# Patient Record
Sex: FEMALE | Race: WHITE | NOT HISPANIC OR LATINO | URBAN - METROPOLITAN AREA
[De-identification: names, ages, dates, MRNs, and addresses within clinical notes are randomized per-mention and may not be internally consistent; named-entity substitution may affect disease eponyms.]

---

## 2017-01-03 ENCOUNTER — OFFICE VISIT - RIVER FALLS (OUTPATIENT)
Dept: FAMILY MEDICINE | Facility: CLINIC | Age: 77
End: 2017-01-03

## 2017-01-03 ASSESSMENT — MIFFLIN-ST. JEOR: SCORE: 1016.53

## 2017-04-17 ENCOUNTER — OFFICE VISIT - RIVER FALLS (OUTPATIENT)
Dept: FAMILY MEDICINE | Facility: CLINIC | Age: 77
End: 2017-04-17

## 2017-07-03 ENCOUNTER — OFFICE VISIT - RIVER FALLS (OUTPATIENT)
Dept: FAMILY MEDICINE | Facility: CLINIC | Age: 77
End: 2017-07-03

## 2017-07-03 ENCOUNTER — COMMUNICATION - RIVER FALLS (OUTPATIENT)
Dept: FAMILY MEDICINE | Facility: CLINIC | Age: 77
End: 2017-07-03

## 2017-07-03 ASSESSMENT — MIFFLIN-ST. JEOR: SCORE: 1002.92

## 2017-07-06 ENCOUNTER — OFFICE VISIT - RIVER FALLS (OUTPATIENT)
Dept: FAMILY MEDICINE | Facility: CLINIC | Age: 77
End: 2017-07-06

## 2017-07-06 ASSESSMENT — MIFFLIN-ST. JEOR: SCORE: 1013.81

## 2018-01-10 ENCOUNTER — OFFICE VISIT - RIVER FALLS (OUTPATIENT)
Dept: FAMILY MEDICINE | Facility: CLINIC | Age: 78
End: 2018-01-10

## 2018-01-10 ASSESSMENT — MIFFLIN-ST. JEOR: SCORE: 957.56

## 2019-03-05 ENCOUNTER — OFFICE VISIT - RIVER FALLS (OUTPATIENT)
Dept: FAMILY MEDICINE | Facility: CLINIC | Age: 79
End: 2019-03-05

## 2019-03-05 ASSESSMENT — MIFFLIN-ST. JEOR: SCORE: 939.42

## 2019-05-29 ENCOUNTER — OFFICE VISIT - RIVER FALLS (OUTPATIENT)
Dept: FAMILY MEDICINE | Facility: CLINIC | Age: 79
End: 2019-05-29

## 2019-05-29 ASSESSMENT — MIFFLIN-ST. JEOR: SCORE: 919.46

## 2019-12-19 ENCOUNTER — OFFICE VISIT - RIVER FALLS (OUTPATIENT)
Dept: FAMILY MEDICINE | Facility: CLINIC | Age: 79
End: 2019-12-19

## 2019-12-19 ASSESSMENT — MIFFLIN-ST. JEOR: SCORE: 916.74

## 2019-12-31 ENCOUNTER — OFFICE VISIT - RIVER FALLS (OUTPATIENT)
Dept: FAMILY MEDICINE | Facility: CLINIC | Age: 79
End: 2019-12-31

## 2019-12-31 ASSESSMENT — MIFFLIN-ST. JEOR: SCORE: 916.74

## 2020-01-09 ENCOUNTER — OFFICE VISIT - RIVER FALLS (OUTPATIENT)
Dept: FAMILY MEDICINE | Facility: CLINIC | Age: 80
End: 2020-01-09

## 2020-01-09 ASSESSMENT — MIFFLIN-ST. JEOR: SCORE: 916.74

## 2020-10-21 ENCOUNTER — OFFICE VISIT - RIVER FALLS (OUTPATIENT)
Dept: FAMILY MEDICINE | Facility: CLINIC | Age: 80
End: 2020-10-21

## 2020-10-24 LAB — BACTERIA SPEC CULT: ABNORMAL

## 2020-10-25 ENCOUNTER — COMMUNICATION - RIVER FALLS (OUTPATIENT)
Dept: FAMILY MEDICINE | Facility: CLINIC | Age: 80
End: 2020-10-25

## 2021-06-14 ENCOUNTER — OFFICE VISIT - RIVER FALLS (OUTPATIENT)
Dept: FAMILY MEDICINE | Facility: CLINIC | Age: 81
End: 2021-06-14

## 2021-06-17 ENCOUNTER — COMMUNICATION - RIVER FALLS (OUTPATIENT)
Dept: FAMILY MEDICINE | Facility: CLINIC | Age: 81
End: 2021-06-17

## 2021-06-17 LAB
BACTERIA SPEC CULT: ABNORMAL
BASOPHILS # BLD MANUAL: 89 10*3/UL (ref 0–200)
BASOPHILS NFR BLD MANUAL: 1 %
BUN SERPL-MCNC: 16 MG/DL (ref 7–25)
BUN/CREAT RATIO - HISTORICAL: ABNORMAL (ref 6–22)
CALCIUM SERPL-MCNC: 9.9 MG/DL (ref 8.6–10.4)
CHLORIDE BLD-SCNC: 102 MMOL/L (ref 98–110)
CO2 SERPL-SCNC: 29 MMOL/L (ref 20–32)
CREAT SERPL-MCNC: 0.63 MG/DL (ref 0.6–0.88)
EGFRCR SERPLBLD CKD-EPI 2021: 84 ML/MIN/1.73M2
EOSINOPHIL # BLD MANUAL: 329 10*3/UL (ref 15–500)
EOSINOPHIL NFR BLD MANUAL: 3.7 %
ERYTHROCYTE [DISTWIDTH] IN BLOOD BY AUTOMATED COUNT: 12.7 % (ref 11–15)
GLUCOSE BLD-MCNC: 101 MG/DL (ref 65–99)
HCT VFR BLD AUTO: 43.9 % (ref 35–45)
HGB BLD-MCNC: 14.4 GM/DL (ref 11.7–15.5)
LYMPHOCYTES # BLD MANUAL: 2056 10*3/UL (ref 850–3900)
LYMPHOCYTES NFR BLD MANUAL: 23.1 %
MCH RBC QN AUTO: 30.1 PG (ref 27–33)
MCHC RBC AUTO-ENTMCNC: 32.8 GM/DL (ref 32–36)
MCV RBC AUTO: 91.6 FL (ref 80–100)
MONOCYTES # BLD MANUAL: 587 10*3/UL (ref 200–950)
MONOCYTES NFR BLD MANUAL: 6.6 %
NEUTROPHILS # BLD MANUAL: 5838 10*3/UL (ref 1500–7800)
NEUTROPHILS NFR BLD MANUAL: 65.6 %
PLATELET # BLD AUTO: 392 10*3/UL (ref 140–400)
PMV BLD: 8.7 FL (ref 7.5–12.5)
POTASSIUM BLD-SCNC: 4.4 MMOL/L (ref 3.5–5.3)
RBC # BLD AUTO: 4.79 10*6/UL (ref 3.8–5.1)
SODIUM SERPL-SCNC: 138 MMOL/L (ref 135–146)
WBC # BLD AUTO: 8.9 10*3/UL (ref 3.8–10.8)

## 2021-06-23 ENCOUNTER — AMBULATORY - RIVER FALLS (OUTPATIENT)
Dept: FAMILY MEDICINE | Facility: CLINIC | Age: 81
End: 2021-06-23

## 2021-06-24 ENCOUNTER — COMMUNICATION - RIVER FALLS (OUTPATIENT)
Dept: FAMILY MEDICINE | Facility: CLINIC | Age: 81
End: 2021-06-24

## 2021-06-24 LAB — FECAL FAT, QUALITATIVE: NORMAL

## 2021-07-15 ENCOUNTER — AMBULATORY - RIVER FALLS (OUTPATIENT)
Dept: FAMILY MEDICINE | Facility: CLINIC | Age: 81
End: 2021-07-15

## 2021-07-16 ENCOUNTER — OFFICE VISIT - RIVER FALLS (OUTPATIENT)
Dept: FAMILY MEDICINE | Facility: CLINIC | Age: 81
End: 2021-07-16

## 2021-07-18 LAB — BACTERIA SPEC CULT: ABNORMAL

## 2021-07-28 ENCOUNTER — OFFICE VISIT - RIVER FALLS (OUTPATIENT)
Dept: FAMILY MEDICINE | Facility: CLINIC | Age: 81
End: 2021-07-28

## 2021-08-04 ENCOUNTER — AMBULATORY - RIVER FALLS (OUTPATIENT)
Dept: FAMILY MEDICINE | Facility: CLINIC | Age: 81
End: 2021-08-04

## 2021-08-06 ENCOUNTER — COMMUNICATION - RIVER FALLS (OUTPATIENT)
Dept: FAMILY MEDICINE | Facility: CLINIC | Age: 81
End: 2021-08-06

## 2021-08-06 LAB — BACTERIA SPEC CULT: NORMAL

## 2021-08-09 ENCOUNTER — OFFICE VISIT - RIVER FALLS (OUTPATIENT)
Dept: FAMILY MEDICINE | Facility: CLINIC | Age: 81
End: 2021-08-09

## 2022-02-11 VITALS
WEIGHT: 106 LBS | DIASTOLIC BLOOD PRESSURE: 72 MMHG | BODY MASS INDEX: 17.04 KG/M2 | HEART RATE: 88 BPM | SYSTOLIC BLOOD PRESSURE: 112 MMHG | HEIGHT: 66 IN

## 2022-02-11 VITALS
DIASTOLIC BLOOD PRESSURE: 54 MMHG | HEART RATE: 76 BPM | DIASTOLIC BLOOD PRESSURE: 73 MMHG | WEIGHT: 95.6 LBS | SYSTOLIC BLOOD PRESSURE: 114 MMHG | SYSTOLIC BLOOD PRESSURE: 129 MMHG | HEART RATE: 74 BPM | HEART RATE: 99 BPM | WEIGHT: 93.7 LBS | WEIGHT: 94.3 LBS | DIASTOLIC BLOOD PRESSURE: 67 MMHG | BODY MASS INDEX: 15.22 KG/M2 | TEMPERATURE: 99.6 F | BODY MASS INDEX: 15.12 KG/M2 | SYSTOLIC BLOOD PRESSURE: 132 MMHG | BODY MASS INDEX: 15.43 KG/M2

## 2022-02-11 VITALS
DIASTOLIC BLOOD PRESSURE: 68 MMHG | HEIGHT: 66 IN | WEIGHT: 102 LBS | HEART RATE: 116 BPM | BODY MASS INDEX: 16.39 KG/M2 | SYSTOLIC BLOOD PRESSURE: 102 MMHG | OXYGEN SATURATION: 92 %

## 2022-02-11 VITALS
HEART RATE: 72 BPM | OXYGEN SATURATION: 94 % | DIASTOLIC BLOOD PRESSURE: 82 MMHG | WEIGHT: 119 LBS | BODY MASS INDEX: 19.13 KG/M2 | SYSTOLIC BLOOD PRESSURE: 150 MMHG | HEIGHT: 66 IN | TEMPERATURE: 97.7 F

## 2022-02-11 VITALS
BODY MASS INDEX: 15.59 KG/M2 | OXYGEN SATURATION: 96 % | BODY MASS INDEX: 15.59 KG/M2 | WEIGHT: 97 LBS | DIASTOLIC BLOOD PRESSURE: 82 MMHG | SYSTOLIC BLOOD PRESSURE: 132 MMHG | HEART RATE: 80 BPM | WEIGHT: 97 LBS | HEIGHT: 66 IN | TEMPERATURE: 98.5 F | BODY MASS INDEX: 15.59 KG/M2 | HEIGHT: 66 IN | HEIGHT: 66 IN | SYSTOLIC BLOOD PRESSURE: 128 MMHG | HEART RATE: 68 BPM | TEMPERATURE: 99.5 F | DIASTOLIC BLOOD PRESSURE: 78 MMHG | WEIGHT: 97 LBS | HEART RATE: 65 BPM | OXYGEN SATURATION: 97 %

## 2022-02-11 VITALS
WEIGHT: 118.4 LBS | HEIGHT: 66 IN | HEART RATE: 75 BPM | HEIGHT: 66 IN | BODY MASS INDEX: 18.64 KG/M2 | SYSTOLIC BLOOD PRESSURE: 130 MMHG | DIASTOLIC BLOOD PRESSURE: 60 MMHG | TEMPERATURE: 98.4 F | SYSTOLIC BLOOD PRESSURE: 116 MMHG | WEIGHT: 116 LBS | HEART RATE: 84 BPM | DIASTOLIC BLOOD PRESSURE: 64 MMHG | BODY MASS INDEX: 19.03 KG/M2

## 2022-02-11 VITALS
DIASTOLIC BLOOD PRESSURE: 74 MMHG | WEIGHT: 122 LBS | TEMPERATURE: 97.8 F | BODY MASS INDEX: 19.69 KG/M2 | HEART RATE: 72 BPM | SYSTOLIC BLOOD PRESSURE: 134 MMHG

## 2022-02-11 VITALS
DIASTOLIC BLOOD PRESSURE: 80 MMHG | HEART RATE: 91 BPM | OXYGEN SATURATION: 88 % | BODY MASS INDEX: 15.69 KG/M2 | TEMPERATURE: 98.7 F | WEIGHT: 97.6 LBS | HEIGHT: 66 IN | SYSTOLIC BLOOD PRESSURE: 124 MMHG

## 2022-02-16 NOTE — PROGRESS NOTES
Patient:   ESTEBAN LEROY            MRN: [id number]            FIN: [id number]               Age:   76 years     Sex:  Female     :  1940   Associated Diagnoses:   Cystitis; Kidney stone   Author:   Senthil Duran MD      Impression and Plan   Diagnosis     Cystitis (MXO40-CV N30.01).     Kidney stone (GSB16-CC N20.0).     Course:  Worsening.    Plan:  follow up for CT stone protocol if CVA pain does not resolve.    Orders     Orders   Charges (Evaluation and Management):  48873 office outpatient visit 15 minutes (Charge) (Order): Quantity: 1, Kidney stone  Cystitis.     Orders (Selected)   Outpatient Orders  Completed  Urinalysis (Request): Dysuria  Order  Urine Culture (Request): Cystitis  Prescriptions  Prescribed  Cipro 250 mg oral tablet: 1 tab(s) ( 250 mg ), PO, q12hr, # 20 tab(s), 0 Refill(s), Type: Maintenance, Pharmacy: [facility], 1 tab(s) po q12 hrs,x10 day(s).        Visit Information      Date of Service: 2017 11:39 am  Performing Location: [facility]  Encounter#: [id number]      Primary Care Provider (PCP):  Senthil Duran MD    NPI# [id number]   Visit type:  New symptom.    Accompanied by:  No one.    Source of history:  Self.    History limitation:  None.       Chief Complaint   Chief complaint discussed and confirmed correct.     2017 11:41 AM CDT   Pt here for kidney stones        History of Present Illness             The patient presents with dysuria, right flank pain.  The dysuria is characterized by burning.  The severity of the dysuria is moderate.  The dysuria is constant.  The dysuria has lasted for 8 hour(s).  There are no modifying factors.  Associated symptoms consist of flank pain, hematuria, denies abdominal distention, denies abdominal pain, denies chills, denies fever, denies pelvic pain, denies vaginal discharge, denies urinary frequency, denies urinary hesitancy and denies urinary urgency.        Review of Systems   Constitutional:   Negative.    Eye:  Negative.    Ear/Nose/Mouth/Throat:  Negative.    Respiratory:  Negative.    Cardiovascular:  Negative.    Gastrointestinal:  Negative.    Genitourinary:  Dysuria.    Hematology/Lymphatics:  Negative.    Endocrine:  Negative.    Immunologic:  Negative.    Musculoskeletal:  Negative.    Integumentary:  Negative.    Neurologic:  Negative.    Psychiatric:  Negative.          All other systems reviewed and negative      Health Status   Allergies:    Allergic Reactions (Selected)  Moderate  Trospium (Swelling of hand)   Medications:  (Selected)   Prescriptions  Prescribed  Cipro 250 mg oral tablet: 1 tab(s) ( 250 mg ), PO, q12hr, # 20 tab(s), 0 Refill(s), Type: Maintenance, Pharmacy: [facility], 1 tab(s) po q12 hrs,x10 day(s)  ipratropium CFC free 17 mcg/inh inhalation aerosol: 2 puff(s), inh, qid, # 1 EA, 5 Refill(s), Type: Maintenance, Pharmacy: [facility], 2 puff(s) inh qid  Documented Medications  Documented  Fish Oil: ( 1,000 mg ), PO, 0 Refill(s), Type: Soft Stop  Vitamin D3 1000 intl units oral capsule: 1 cap(s) ( 1,000 International Unit ), PO, daily, 0 Refill(s), Type: Soft Stop  calcium carbonate: 0 Refill(s), Type: Soft Stop  gabapentin 100 mg oral tablet: 0 Refill(s), Type: Maintenance  lisinopril 20 mg oral tablet: 1 tab(s) ( 20 mg ), po, daily, 0 Refill(s)  omeprazole: PO, daily, 0 Refill(s), Type: Soft Stop  oxycodone 5 mg oral tablet: 1 tab(s) ( 5 mg ), PO, q4 hrs, 0 Refill(s), Type: Soft Stop  senna oral tablet: 2 tab(s), po, daily, 0 Refill(s), Type: Maintenance   Problem list:    All Problems  Allergic Rhinitis / ICD-9-.9 / Confirmed  Colon cancer / ICD-9-.9 / Confirmed  Hypertension / ICD-9-.9 / Confirmed  Pancreatitis / ICD-9-.0 / Confirmed  VITREAL HEMORRHAGE RIGHT EYE / Confirmed      Histories   Past Medical History:    Active  Hypertension (401.9)  Allergic Rhinitis (477.9)  Colon cancer (153.9)   Family History:    Cancer  Mother (Lian,  )  Heart disease  Father (Partha, )  Heart attack  Father (Partha, )     Procedure history:    No active procedure history items have been selected or recorded.   Social History:        Tobacco Assessment: Current            Current, Cigarettes, 10 per day.      Exercise and Physical Activity Assessment: Regular exercise        Physical Examination   Vital signs reviewed  and within acceptable limits    Vital Signs   2017 11:41 AM CDT Temperature Tympanic 97.8 DegF  LOW    Peripheral Pulse Rate 72 bpm    Pulse Site Radial artery    HR Method Manual    Systolic Blood Pressure 134 mmHg    Diastolic Blood Pressure 74 mmHg    Mean Arterial Pressure 94 mmHg    BP Site Left arm    BP Method Manual      Measurements from flowsheet : Measurements   2017 11:41 AM CDT   Weight Measured - Standard                122 lb     General:  No acute distress.    Respiratory:  Lungs are clear to auscultation, Respirations are non-labored, Breath sounds are equal, Symmetrical chest wall expansion.    Cardiovascular:  Normal rate, Regular rhythm, No murmur, No gallop, Good pulses equal in all extremities, Normal peripheral perfusion, No edema.    Gastrointestinal:  Soft, Non-tender, Non-distended, Normal bowel sounds, No organomegaly.    Genitourinary:  mild right CVA tenderness.    Integumentary:  Warm, Dry, Pink.    Neurologic:  Alert, Oriented.    Psychiatric:  Cooperative, Appropriate mood & affect.       Review / Management   Results review:  UA: Positive Heme and Positive LE.

## 2022-02-16 NOTE — TELEPHONE ENCOUNTER
---------------------  From: Boris CHARLES Carie SWARTZ (Phone Messages Pool ([id number] [facility] [place])   To: Hugh Torres MD;     Sent: 7/20/2021 9:27:25 AM CDT  Subject: urine culture     Phone Message    PCP:   FREDIS      Time of Call:  9:20am       Person Calling:  pt  Phone number:  [phone]    Note:   Pt calling asking for results of her urine culture from last week. Please advise     CULTURE, URINE, ROUTINE         Micro Number:      [id number]    Test Status:       Final    Specimen Source:   Urine    Specimen Quality:  Adequate    Result:            Greater than 100,000 CFU/mL of Citrobacter freundii                              C.freundii                            ----------------                            INT   JOSE     AMOX/CLAVULANATE       R     >=32     CEFAZOLIN              R     >=64 **1     CEFEPIME               S     <=1     CEFTRIAXONE            S     <=1     CIPROFLOXACIN          S     <=0.25     ERTAPENEM              S     <=0.5     GENTAMICIN             S     <=1     IMIPENEM               S     <=0.25     LEVOFLOXACIN           S     <=0.12     NITROFURANTOIN         S     <=16     PIP/TAZOBACTAM         S     <=4     TOBRAMYCIN             S     <=1     TRIMETHOPRIM/SULFA     S     <=20    S=Susceptible  I=Intermediate  R=Resistant  * = Not Tested  NR = Not Reported  **NN = See Therapy Comments      THERAPY COMMENTS        Note 1:      For uncomplicated UTI caused by E. coli,      K. pneumoniae or P. mirabilis: Cefazolin is      susceptible if JOSE <32 mcg/mL and predicts      susceptible to the oral agents cefaclor, cefdinir,      cefpodoxime, cefprozil, cefuroxime, cephalexin      and loracarbef.  Lab test performed by:  Lab Mnemonic:[facility]  [address] [place] [address]  Todd Pang M.D.    Last office visit and reason:  7/16/21 Incontinence of urine---------------------  From: Hugh Torres MD   To: Phone Messages Pool ([id number]_[facility]);      Sent: 7/20/2021 9:45:12 AM CDT  Subject: RE: urine culture   Actions: Notify the patient with results, Notify the pharmacy with prescription(s)      UTI. Levaquin 250 mg daily for 7 days.Pt informed. Rx sent.   Pt also needing to reschedule appt she has tomorrow 7/21 with TAW. Transferred to scheduling.

## 2022-02-16 NOTE — NURSING NOTE
Comprehensive Intake Entered On:  12/31/2019 2:16 PM CST    Performed On:  12/31/2019 2:13 PM CST by Shalini Pineda CMA               Summary   Chief Complaint :   Pt here for fever   Weight Measured :   97 lb(Converted to: 97 lb 0 oz, 44.00 kg)    Height Measured :   66 in(Converted to: 5 ft 6 in, 167.64 cm)    Body Mass Index :   15.65 kg/m2 (LOW)    Body Surface Area :   1.43 m2   Peripheral Pulse Rate :   65 bpm   Temperature Tympanic :   99.5 DegF(Converted to: 37.5 DegC)    Oxygen Saturation :   96 %   Shalini Pineda CMA - 12/31/2019 2:13 PM CST   Health Status   Allergies Verified? :   Yes   Medication History Verified? :   Yes   Medical History Verified? :   Yes   Pre-Visit Planning Status :   Completed   Tobacco Use? :   Current every day smoker   Shalini Pineda CMA - 12/31/2019 2:13 PM CST   Consents   Consent for Immunization Exchange :   Consent Granted   Consent for Immunizations to Providers :   Consent Granted   Shalini Pineda CMA - 12/31/2019 2:13 PM CST   Meds / Allergies   (As Of: 12/31/2019 2:16:37 PM CST)   Allergies (Active)   trospium  Estimated Onset Date:   Unspecified ; Reactions:   Swelling of hand ; Created By:   Kadi Ko RN; Reaction Status:   Active ; Category:   Drug ; Substance:   trospium ; Type:   Allergy ; Severity:   Moderate ; Updated By:   Kadi Ko RN; Reviewed Date:   12/31/2019 2:14 PM CST        Medication List   (As Of: 12/31/2019 2:16:37 PM CST)   Prescription/Discharge Order    azithromycin  :   azithromycin ; Status:   Prescribed ; Ordered As Mnemonic:   azithromycin 250 mg oral tablet ; Simple Display Line:   250 mg, 1 tab(s), PO, Daily, for 7 day(s), 7 tab(s), 0 Refill(s) ; Ordering Provider:   Senthil Duran MD; Catalog Code:   azithromycin ; Order Dt/Tm:   12/31/2019 10:42:26 AM CST          ipratropium  :   ipratropium ; Status:   Prescribed ; Ordered As Mnemonic:   ipratropium CFC free 17 mcg/inh inhalation aerosol ; Simple Display Line:   2  puff(s), inh, qid, 1 EA, 5 Refill(s) ; Ordering Provider:   Senthil Duran MD; Catalog Code:   ipratropium ; Order Dt/Tm:   12/29/2016 4:19:08 PM CST            Home Meds    cholecalciferol  :   cholecalciferol ; Status:   Documented ; Ordered As Mnemonic:   Vitamin D3 1000 intl units oral capsule ; Simple Display Line:   1,000 International Unit, 1 cap(s), PO, daily ; Catalog Code:   cholecalciferol ; Order Dt/Tm:   7/24/2012 2:30:03 PM CDT          cyclobenzaprine  :   cyclobenzaprine ; Status:   Documented ; Ordered As Mnemonic:   cyclobenzaprine ; Simple Display Line:   Oral, 0 Refill(s) ; Catalog Code:   cyclobenzaprine ; Order Dt/Tm:   12/19/2019 2:44:36 PM CST          gabapentin  :   gabapentin ; Status:   Documented ; Ordered As Mnemonic:   gabapentin 100 mg oral tablet ; Catalog Code:   gabapentin ; Order Dt/Tm:   1/7/2013 11:32:55 AM CST          lisinopril  :   lisinopril ; Status:   Documented ; Ordered As Mnemonic:   lisinopril 20 mg oral tablet ; Simple Display Line:   20 mg, 1 tab(s), po, daily ; Catalog Code:   lisinopril ; Order Dt/Tm:   9/15/2010 11:31:32 AM CDT          omega-3 polyunsaturated fatty acids  :   omega-3 polyunsaturated fatty acids ; Status:   Documented ; Ordered As Mnemonic:   Fish Oil ; Simple Display Line:   1,000 mg, PO ; Catalog Code:   omega-3 polyunsaturated fatty acids ; Order Dt/Tm:   7/24/2012 2:30:31 PM CDT          omeprazole  :   omeprazole ; Status:   Documented ; Ordered As Mnemonic:   omeprazole ; Simple Display Line:   PO, daily ; Catalog Code:   omeprazole ; Order Dt/Tm:   7/24/2012 2:27:58 PM CDT          senna  :   senna ; Status:   Documented ; Ordered As Mnemonic:   senna oral tablet ; Simple Display Line:   2 tab(s), po, daily ; Catalog Code:   senna ; Order Dt/Tm:   1/7/2013 11:33:17 AM CST

## 2022-02-16 NOTE — LETTER
(Inserted Image. Unable to display)                       2021  Re:  ESTEBAN LEROY  :  1940        [place] [address]      Dear    [facility],    The following patient has been referred to your office/practice:  ESTEBAN LEROY     Appointment is pending with Urology. Please contact patient to schedule.        Please refer to the attached clinical documentation for a summary of ESTEBAN's care.  Please do not hesitate to contact our office if any additional clinical questions arise. All relevant records and transition of care documents should be mailed or faxed.     Your assistance in providing continuity of care is appreciated.         Sincerely,   [address]  (P) [phone]  (F) [phone]

## 2022-02-16 NOTE — PROGRESS NOTES
Patient:   ESTEBAN LEROY            MRN: [id number]            FIN: [id number]               Age:   80 years     Sex:  Female     :  1940   Associated Diagnoses:   Urinary frequency   Author:   Senthil Duran MD      Impression and Plan   Diagnosis     Urinary frequency (QCC65-IG R35.0).     Course:  Improving.    Orders     Orders   Charges (Evaluation and Management):  80883 physician telephone evaluation 11-20 min (Charge) (Order): Quantity: 1, Urinary frequency.     Orders (Selected)   Outpatient Orders  Ordered (Dispatched)  Culture, Urine, Routine* ([facility]): Specimen Type: Urine (Clean Catch), Collection Date: 10/21/20 13:38:00 CDT.        Visit Information      Date of Service: 10/21/2020 12:23 pm  Performing Location: [facility]  Encounter#: [id number]      Primary Care Provider (PCP):  Senthil Duran MD    NPI# [id number]      Referring Provider:  Senthil Duran MD    NPI# [id number]   Visit type:  Telephone Encounter.    Source of history:  Self.    Location of patient:  _Home  Call Start Time:   _1330  Call End Time:    _1343   Referral source:  Self.    History limitation:  None.       Chief Complaint   _      History of Present Illness   Today's visit was conducted via telephone due to the COVID-19 pandemic.     Reason for visit:  _Patient had urinary frequency yesterday but it is resolved today.  Denies: dysuria, hematuria, fever/chills, flank pain or abdominal pain, urgency.  She would like to run a urine culture to make sure it is not a urine infection.      Review of Systems   Constitutional:  Negative.    Eye:  Negative.    Ear/Nose/Mouth/Throat:  Negative.    Respiratory:  Negative.    Cardiovascular:  Negative.    Gastrointestinal:  Negative.    Genitourinary:  Negative.    Hematology/Lymphatics:  Negative.    Endocrine:  Negative.    Immunologic:  Negative.    Musculoskeletal:  Negative.    Integumentary:  Negative.    Neurologic:  Negative.    Psychiatric:  Negative.    All  other systems reviewed and negative      Health Status   Allergies:    Allergic Reactions (Selected)  Moderate  Trospium (Swelling of hand)   Medications:  (Selected)   Prescriptions  Prescribed  Augmentin 875 mg-125 mg oral tablet: = 1 tab(s), po, bidac, # 20 tab(s), 0 Refill(s), Type: Maintenance, Pharmacy: [facility], 1 tab(s) Oral bidac,x10 day(s)  ipratropium CFC free 17 mcg/inh inhalation aerosol: 2 puff(s), inh, qid, # 1 EA, 5 Refill(s), Type: Maintenance, Pharmacy: [facility], 2 puff(s) inh qid  Documented Medications  Documented  Fish Oil: ( 1,000 mg ), PO, 0 Refill(s), Type: Soft Stop  Vitamin D3 1000 intl units oral capsule: 1 cap(s) ( 1,000 International Unit ), PO, daily, 0 Refill(s), Type: Soft Stop  cyclobenzaprine: Oral, 0 Refill(s), Type: Maintenance  gabapentin 100 mg oral tablet: 0 Refill(s), Type: Maintenance  lisinopril 20 mg oral tablet: 1 tab(s) ( 20 mg ), po, daily, 0 Refill(s)  omeprazole: PO, daily, 0 Refill(s), Type: Soft Stop  oxyCODONE 5 mg oral capsule: = 1 cap(s) ( 5 mg ), Oral, q6 hrs, PRN: for pain, 0 Refill(s), Type: Maintenance  senna oral tablet: 2 tab(s), po, daily, 0 Refill(s), Type: Maintenance,    Medications          *denotes recorded medication          Augmentin 875 mg-125 mg oral tablet: 1 tab(s), po, bidac, for 10 day(s), 20 tab(s), 0 Refill(s).          *Vitamin D3 1000 intl units oral capsule: 1,000 International Unit, 1 cap(s), PO, daily.          *cyclobenzaprine: Oral, 0 Refill(s).          *gabapentin 100 mg oral tablet: 0 Refill(s), Type: Maintenance.          ipratropium CFC free 17 mcg/inh inhalation aerosol: 2 puff(s), inh, qid, 1 EA, 5 Refill(s).          *lisinopril 20 mg oral tablet: 20 mg, 1 tab(s), po, daily.          *Fish Oil: 1,000 mg, PO.          *omeprazole: PO, daily.          *oxyCODONE 5 mg oral capsule: 5 mg, 1 cap(s), Oral, q6 hrs, PRN: for pain, 0 Refill(s).          *senna oral tablet: 2 tab(s), po, daily.       Problem list:     All Problems  Allergic Rhinitis / ICD-9-.9 / Confirmed  Bunion of great toe of left foot / SNOMED CT 5141077940 / Confirmed  Colon cancer / ICD-9-.9 / Confirmed  Hypertension / ICD-9-.9 / Confirmed  Nephrolithiasis / SNOMED CT 812494009 / Confirmed  Pancreatitis / ICD-9-.0 / Confirmed  Peripheral vascular disease / SNOMED CT 2289015889 / Confirmed  Pressure ulcer of left foot, stage 2 / SNOMED CT 4292083699 / Confirmed  VITREAL HEMORRHAGE RIGHT EYE / Confirmed      Histories   Past Medical History:    Active  Hypertension (401.9)  Allergic Rhinitis (477.9)  Colon cancer (153.9)   Family History:    Cancer  Mother (Lian, )  Heart disease  Father (Partha, )  Heart attack  Father (Partah, )     Procedure history:    No active procedure history items have been selected or recorded.   Social History:        Tobacco Assessment: Current            Current, Cigarettes, 10 per day.      Exercise and Physical Activity Assessment: Regular exercise        Physical Examination   General:  Alert and oriented, No acute distress.    Respiratory:  Respirations are non-labored.    Psychiatric:  Cooperative, Appropriate mood & affect, Normal judgment.       Health Maintenance      Recommendations     Pending (in the next year)        OverDue           Influenza Vaccine due  20  and every 1  year(s)        Due            Depression Screen (Female) due  10/21/20  and every 1  year(s)           Fall Risk Screen (Female) due  10/21/20  and every 1  year(s)           HIV Screen (if sexually active) (Female) due  10/21/20  and every 1  year(s)           Lipid Disorders Screen (Female) due  10/21/20  and every 1  year(s)           Osteoporosis Screen due  10/21/20  and every 2  year(s)           STD Counseling (if sexually active) (Female) due  10/21/20  and every 1  year(s)           Syphilis Screen (if sexually active) (Female) due  10/21/20  and every 1  year(s)            Zoster/Shingles Vaccine due  10/21/20  One-time only        Due In Future            Body Mass Index Check (Female) not due until  01/09/21  and every 1  year(s)           High Blood Pressure Screen (Female) not due until  01/09/21  and every 1  year(s)           Tobacco Use Screen (Female) not due until  01/09/21  and every 1  year(s)     Satisfied (in the past 1 year)        Satisfied            Body Mass Index Check (Female) on  01/09/20.           Body Mass Index Check (Female) on  12/31/19.           Body Mass Index Check (Female) on  12/19/19.           High Blood Pressure Screen (Female) on  01/09/20.           High Blood Pressure Screen (Female) on  12/19/19.           Tobacco Use Screen (Female) on  01/09/20.           Tobacco Use Screen (Female) on  12/31/19.           Tobacco Use Screen (Female) on  12/19/19.

## 2022-02-16 NOTE — TELEPHONE ENCOUNTER
"---------------------  From: Shalini Pineda CMA   To: Senthil Duran MD;     Sent: 12/30/2019 3:37:30 PM CST  Subject: General Message     Pt called to say that she has been running a fever and is requesting abx....explanation to pt that policy states pt needs to be seen.  Pt states that she can not get out of her driveway and \"may be dead by tomorrow\".      Concepción  [phone]  "

## 2022-02-16 NOTE — NURSING NOTE
Comprehensive Intake Entered On:  10/21/2020 1:40 PM CDT    Performed On:  10/21/2020 1:29 PM CDT by Shalini Pineda CMA               Summary   Chief Complaint :   Consent for telephone visit to discuss urine issues   Edwin CINDY Shalini - 10/21/2020 1:29 PM CDT   Health Status   Allergies Verified? :   Yes   Medication History Verified? :   Yes   Medical History Verified? :   Yes   Tobacco Use? :   Current every day smoker   Edwin Shalini WHITE - 10/21/2020 1:29 PM CDT   Consents   Consent for Immunization Exchange :   Consent Granted   Consent for Immunizations to Providers :   Consent Granted   Edwin Shalini WHITE - 10/21/2020 1:29 PM CDT   Meds / Allergies   (As Of: 10/21/2020 1:40:22 PM CDT)   Allergies (Active)   trospium  Estimated Onset Date:   Unspecified ; Reactions:   Swelling of hand ; Created By:   Kadi Ko RN; Reaction Status:   Active ; Category:   Drug ; Substance:   trospium ; Type:   Allergy ; Severity:   Moderate ; Updated By:   Kadi Ko RN; Reviewed Date:   10/21/2020 1:40 PM CDT        Medication List   (As Of: 10/21/2020 1:40:22 PM CDT)   Prescription/Discharge Order    amoxicillin-clavulanate  :   amoxicillin-clavulanate ; Status:   Processing ; Ordered As Mnemonic:   Augmentin 875 mg-125 mg oral tablet ; Ordering Provider:   Senthil Duran MD; Action Display:   Complete ; Catalog Code:   amoxicillin-clavulanate ; Order Dt/Tm:   10/21/2020 1:40:07 PM CDT          ipratropium  :   ipratropium ; Status:   Prescribed ; Ordered As Mnemonic:   ipratropium CFC free 17 mcg/inh inhalation aerosol ; Simple Display Line:   2 puff(s), inh, qid, 1 EA, 5 Refill(s) ; Ordering Provider:   Senthil Duran MD; Catalog Code:   ipratropium ; Order Dt/Tm:   12/29/2016 4:19:08 PM CST            Home Meds    cholecalciferol  :   cholecalciferol ; Status:   Documented ; Ordered As Mnemonic:   Vitamin D3 1000 intl units oral capsule ; Simple Display Line:   1,000 International Unit, 1 cap(s),  PO, daily ; Catalog Code:   cholecalciferol ; Order Dt/Tm:   7/24/2012 2:30:03 PM CDT          cyclobenzaprine  :   cyclobenzaprine ; Status:   Documented ; Ordered As Mnemonic:   cyclobenzaprine ; Simple Display Line:   Oral, 0 Refill(s) ; Catalog Code:   cyclobenzaprine ; Order Dt/Tm:   12/19/2019 2:44:36 PM CST          gabapentin  :   gabapentin ; Status:   Documented ; Ordered As Mnemonic:   gabapentin 100 mg oral tablet ; Catalog Code:   gabapentin ; Order Dt/Tm:   1/7/2013 11:32:55 AM CST          lisinopril  :   lisinopril ; Status:   Documented ; Ordered As Mnemonic:   lisinopril 20 mg oral tablet ; Simple Display Line:   20 mg, 1 tab(s), po, daily ; Catalog Code:   lisinopril ; Order Dt/Tm:   9/15/2010 11:31:32 AM CDT          omega-3 polyunsaturated fatty acids  :   omega-3 polyunsaturated fatty acids ; Status:   Documented ; Ordered As Mnemonic:   Fish Oil ; Simple Display Line:   1,000 mg, PO ; Catalog Code:   omega-3 polyunsaturated fatty acids ; Order Dt/Tm:   7/24/2012 2:30:31 PM CDT          omeprazole  :   omeprazole ; Status:   Documented ; Ordered As Mnemonic:   omeprazole ; Simple Display Line:   PO, daily ; Catalog Code:   omeprazole ; Order Dt/Tm:   7/24/2012 2:27:58 PM CDT          oxyCODONE  :   oxyCODONE ; Status:   Documented ; Ordered As Mnemonic:   oxyCODONE 5 mg oral capsule ; Simple Display Line:   5 mg, 1 cap(s), Oral, q6 hrs, PRN: for pain, 0 Refill(s) ; Catalog Code:   oxyCODONE ; Order Dt/Tm:   1/9/2020 9:56:36 AM CST          senna  :   senna ; Status:   Documented ; Ordered As Mnemonic:   senna oral tablet ; Simple Display Line:   2 tab(s), po, daily ; Catalog Code:   senna ; Order Dt/Tm:   1/7/2013 11:33:17 AM CST            ID Risk Screen   Recent Travel History :   No recent travel   Family Member Travel History :   No recent travel   Other Exposure to Infectious Disease :   Unknown   Shalini Pineda CMA - 10/21/2020 1:29 PM CDT

## 2022-02-16 NOTE — TELEPHONE ENCOUNTER
---------------------  From: Shalini Pineda CMA   To: Senthil Duran MD;     Sent: 1/6/2020 11:33:30 AM CST  Subject: appt want     Pt called to say that her face is now really swollen.. she is wanting to know if she can be worked into the schedule to be seen...    Concepción  [phone]

## 2022-02-16 NOTE — PROGRESS NOTES
"   Patient:   ESTEBAN LEROY            MRN: [id number]            FIN: [id number]               Age:   79 years     Sex:  Female     :  1940   Associated Diagnoses:   Constipation   Author:   Senthil Duran MD      Impression and Plan   Diagnosis     Constipation (JRO09-RA K59.00).     Plan:       Diet: Simple observation at this point since the problem seems to be improved - follow up in the ER if significant problems recur..    Orders     Orders   Charges (Evaluation and Management):  38549 office outpatient visit 15 minutes (Charge) (Order): Quantity: 1, Constipation.        Visit Information   Visit type:  New symptom.    Accompanied by:  No one.    Source of history:  Self.    History limitation:  None.       Chief Complaint   2019 2:40 PM CST   Pt here with bowel problems        History of Present Illness             The patient presents with constipation and abdominal pain.  The constipation is characterized by incomplete evacuation of stool, bloating and abdominal discomfort.  The severity of the constipation is moderate.  The timing/course of symptom(s) associated with constipation has resolved.  The constipation has lasted for 2 day(s).  The context of the constipation: occurred Associated with use of Oxycodone.  Patient developed constipation two days ago.  Had abdominal bloating and gas pains.  Took Mirilax and Senna and has now develped loose stools.  Feels that she is now \"cleaned out\".  Questions if she saw some blood in the stool.  No vomiting.  Appetite now starting to return.  History of diverticulosis, rectal cancer, appendectomy, hysterectomy, choecystectomy.  Had a colonoscopy in October and a few polyps were removed.  Has been on Omeprazole for some time - no epigastric pain or acid reflux..  The patient's last bowel movement was 1 hour(s) ago.  Exacerbating factors consist of medication.  Relieving factors consist of medication.  Associated symptoms consist of abdominal distention, " back pain, decreased appetite, denies chills, denies fever, denies nausea and denies vomiting.        Review of Systems   Constitutional:  Negative.    Eye:  Negative.    Ear/Nose/Mouth/Throat:  Negative.    Respiratory:  Negative.    Cardiovascular:  Negative.    Gastrointestinal:  Negative except as documented in history of present illness.    Genitourinary:  Negative.    Hematology/Lymphatics:  Negative.    Endocrine:  Negative.    Immunologic:  Negative.    Musculoskeletal:  Negative.    Integumentary:  Negative.    Neurologic:  Negative.    Psychiatric:  Negative.    All other systems reviewed and negative      Health Status   Allergies:    Allergic Reactions (Selected)  Moderate  Trospium (Swelling of hand)   Medications:  (Selected)   Prescriptions  Prescribed  ipratropium CFC free 17 mcg/inh inhalation aerosol: 2 puff(s), inh, qid, # 1 EA, 5 Refill(s), Type: Maintenance, Pharmacy: [facility], 2 puff(s) inh qid  oxyCODONE 5 mg oral tablet: = 1 tab(s) ( 5 mg ), PO, q4 hrs, # 1 tab(s), 0 Refill(s), Type: Acute, patient has supply at home (Rx)  Documented Medications  Documented  Fish Oil: ( 1,000 mg ), PO, 0 Refill(s), Type: Soft Stop  Vitamin D3 1000 intl units oral capsule: 1 cap(s) ( 1,000 International Unit ), PO, daily, 0 Refill(s), Type: Soft Stop  cyclobenzaprine: Oral, 0 Refill(s), Type: Maintenance  gabapentin 100 mg oral tablet: 0 Refill(s), Type: Maintenance  lisinopril 20 mg oral tablet: 1 tab(s) ( 20 mg ), po, daily, 0 Refill(s)  omeprazole: PO, daily, 0 Refill(s), Type: Soft Stop  senna oral tablet: 2 tab(s), po, daily, 0 Refill(s), Type: Maintenance      Physical Examination   Vital Signs   12/19/2019 2:40 PM CST Peripheral Pulse Rate 68 bpm    Systolic Blood Pressure 132 mmHg  HI    Diastolic Blood Pressure 82 mmHg  HI    Mean Arterial Pressure 99 mmHg    Oxygen Saturation 97 %      Measurements from flowsheet : Measurements   12/19/2019 2:40 PM CST Height Measured - Standard 66 in     Weight Measured - Standard 97 lb    BSA 1.43 m2    Body Mass Index 15.65 kg/m2  LOW      General:  No acute distress.    Neck:  Supple, No lymphadenopathy, No thyromegaly.    Respiratory:  Lungs are clear to auscultation, Respirations are non-labored, Breath sounds are equal, Symmetrical chest wall expansion.    Cardiovascular:  Normal rate, Regular rhythm, No murmur, No gallop, Good pulses equal in all extremities, Normal peripheral perfusion, No edema.    Gastrointestinal:  Soft, Non-distended, Normal bowel sounds, No organomegaly, mild diffuse tenderness to palpation, No gaurding or rebound or percussion tenderness, rectal discharge on a pad does not appear to contain any blood.    Integumentary:  Warm, Dry, Pink.    Neurologic:  Alert, Oriented.    Psychiatric:  Cooperative, Appropriate mood & affect.

## 2022-02-16 NOTE — PROGRESS NOTES
Patient:   ESTEBAN LEROY            MRN: [id number]            FIN: [id number]               Age:   77 years     Sex:  Female     :  1940   Associated Diagnoses:   Post herpetic neuralgia; Shingles   Author:   Senthil Duran MD      Impression and Plan   Diagnosis     Post herpetic neuralgia (HMQ20-TT B02.29).     Shingles (GLR75-UO B02.9).     Course:  Worsening.    Plan:  continue gabapentin and oxycodone.    Orders     Orders   Charges (Evaluation and Management):  30170 office outpatient visit 15 minutes (Charge) (Order): Quantity: 1, Post herpetic neuralgia  Shingles.        Visit Information      Date of Service: 01/10/2018 01:40 pm  Performing Location: [facility]  Encounter#: [id number]      Primary Care Provider (PCP):  NONE ,       Referring Provider:  Senthil Duran MD    NPI# [id number]   Visit type:  New symptom.    Accompanied by:  No one.    Source of history:  Self.    History limitation:  None.       Chief Complaint   Chief complaint discussed and confirmed correct.     1/10/2018 1:43 PM CST    Pt here for shingles        History of Present Illness             The patient presents with rash.  The location of the rash is on the left, back.  The rash is described as red, dry, herpetic and painful.  The severity of the symptom(s) associated to the rash is severe.  The timing/ course of symptom(s) related to the rash is constant.  The rash has lasted for 2 week(s).  The context of the rash: occurred associated with chicken pox.  There are no modifying factors.  Associated symptoms consist of none.        Review of Systems   Constitutional:  Negative.    Eye:  Negative.    Ear/Nose/Mouth/Throat:  Negative.    Respiratory:  Negative.    Cardiovascular:  Negative.    Gastrointestinal:  Negative.    Genitourinary:  Negative.    Hematology/Lymphatics:  Negative.    Endocrine:  Negative.    Immunologic:  Negative.    Musculoskeletal:  Negative.    Integumentary:  Negative except as  documented in history of present illness.    Neurologic:  Negative.    Psychiatric:  Negative.    All other systems reviewed and negative      Health Status   Allergies:    Allergic Reactions (Selected)  Moderate  Trospium (Swelling of hand)   Medications:  (Selected)   Prescriptions  Prescribed  ipratropium CFC free 17 mcg/inh inhalation aerosol: 2 puff(s), inh, qid, # 1 EA, 5 Refill(s), Type: Maintenance, Pharmacy: [facility], 2 puff(s) inh qid  Documented Medications  Documented  Fish Oil: ( 1,000 mg ), PO, 0 Refill(s), Type: Soft Stop  Vitamin D3 1000 intl units oral capsule: 1 cap(s) ( 1,000 International Unit ), PO, daily, 0 Refill(s), Type: Soft Stop  gabapentin 100 mg oral tablet: 0 Refill(s), Type: Maintenance  lisinopril 20 mg oral tablet: 1 tab(s) ( 20 mg ), po, daily, 0 Refill(s)  omeprazole: PO, daily, 0 Refill(s), Type: Soft Stop  oxycodone 5 mg oral tablet: 1 tab(s) ( 5 mg ), PO, q4 hrs, 0 Refill(s), Type: Soft Stop  senna oral tablet: 2 tab(s), po, daily, 0 Refill(s), Type: Maintenance   Problem list:    All Problems  Allergic Rhinitis / ICD-9-.9 / Confirmed  Colon cancer / ICD-9-.9 / Confirmed  Hypertension / ICD-9-.9 / Confirmed  Nephrolithiasis / SNOMED CT 647480903 / Confirmed  Pancreatitis / ICD-9-.0 / Confirmed  VITREAL HEMORRHAGE RIGHT EYE / Confirmed      Histories   Past Medical History:    Active  Hypertension (401.9)  Allergic Rhinitis (477.9)  Colon cancer (153.9)   Family History:    Cancer  Mother (Lian, )  Heart disease  Father (Partha, )  Heart attack  Father (Partha, )     Procedure history:    No active procedure history items have been selected or recorded.   Social History:        Tobacco Assessment: Current            Current, Cigarettes, 10 per day.      Exercise and Physical Activity Assessment: Regular exercise        Physical Examination   Vital signs reviewed  and within acceptable limits    Vital Signs   1/10/2018  1:43 PM CST Peripheral Pulse Rate 88 bpm    Systolic Blood Pressure 112 mmHg    Diastolic Blood Pressure 72 mmHg    Mean Arterial Pressure 85 mmHg    BP Site Right arm      Measurements from flowsheet : Measurements   1/10/2018 1:43 PM CST Height Measured - Standard 66 in    Weight Measured - Standard 106 lb    BSA 1.49 m2    Body Mass Index 17.11 kg/m2  LOW      General:  Alert and oriented X 3, No acute distress, Warm, Pink, Intact.         Appearance: Within normal limits, Well nourished, Calm.         Hydration: Within normal limits.         Psych: Within normal limits, Appropriate mood and affect, Cooperative, Normal judgment.    Integumentary:  healing rash in dermatome pattern left thoracic back.

## 2022-02-16 NOTE — LETTER
(Inserted Image. Unable to display)       [address] [place] [address]    August 12, 2021        ESTEBAN LEROY  [address] [place] [address]        Dear ESTEBAN,      Thank you for selecting [facility] for your healthcare needs.       CT shows emphysema, atherosclerosis, a lumbar compression fracture, but nothing to explain our discomfort. Please schedule an appointment in August to review.          Please contact me or my assistant at [phone] you have any questions or concerns.     Sincerely,        Hugh Torres MD

## 2022-02-16 NOTE — LETTER
(Inserted Image. Unable to display)       [address] [place] [address]    August 13, 2021        ESTEBAN LEROY  [address] [place] [address]        Dear ESTEBAN,      Thank you for selecting [facility] for your healthcare needs.       CT shows an old compression fracture of L1 and atherosclerosis. No evidence of cancer or bowel problem. Please schedule an appointment.          Please contact me or my assistant at [phone] you have any questions or concerns.     Sincerely,        Hugh Torres MD

## 2022-02-16 NOTE — LETTER
(Inserted Image. Unable to display)       [address] [place] [address]  June 17, 2021      ESTEBAN LEROY      [address] [place] [address]        Dear ESTEBAN,    Thank you for selecting [facility] for your healthcare needs.  Below you will find the results of your recent tests done at our clinic.     Normal Chemistry and blood counts. THe antibiotic that you are being treated with should be effective based upon the urein culture. Please call if you are not better.      Result Name Current Result Previous Result Reference Range   Sodium Level (mmol/L)  138 6/14/2021  135 - 146   Potassium Level (mmol/L)  4.4 6/14/2021  3.5 - 5.3   Chloride Level (mmol/L)  102 6/14/2021  98 - 110   CO2 Level (mmol/L)  29 6/14/2021  20 - 32   Glucose Level (mg/dL) ((H)) 101 6/14/2021  65 - 99   BUN (mg/dL)  16 6/14/2021  7 - 25   Creatinine Level (mg/dL)  0.63 6/14/2021  0.60 - 0.88   eGFR (mL/min/1.73m2)  84 6/14/2021  > OR = 60 -    Calcium Level (mg/dL)  9.9 6/14/2021  8.6 - 10.4   WBC  8.9 6/14/2021  3.8 - 10.8   Hgb (gm/dL)  14.4 6/14/2021  11.7 - 15.5   Platelet  392 6/14/2021  140 - 400   Urine Culture ((A)) See comment 6/14/2021 ((A)) See comment 10/21/2020        Please contact me or my assistant at [phone] if you have any questions.     Sincerely,        Hugh Torres MD

## 2022-02-16 NOTE — CONFIDENTIAL NOTE
Chief Complaint    Verbal consent given for telephone visit. Pt is wanting to know why she keeps getting UTI's. Pt c/o edema in feet and ankle.  History of Present Illness       Today's visit was conducted via telephone due to the COVID-19 pandemic.  Patient's consent to telephone visit was obtained and documented.       Call Start Time:  1415       Call End Time:   1424       Radha Royal is an 81-year-old with a history of anal cancer treated with chemotherapy and radiation over 10 years ago.  She has osteoporosis and chronic back pain as a result of her radiation she tells me.  She complains of nocturia and incontinence with dysuria on occasion.  She was treated for UTI in June and the symptoms recurred.  Review of Systems       No fever, chills, nausea, vomiting, abdominal pain, hematuria, rectal bleeding.  Chronic back pain unchanged.  Assessment/Plan       Anal cancer (C21.0)          Treated with radiation and chemotherapy.         Ordered:          78122 physician telephone evaluation 5-10 min (Charge), Quantity: 1, Bladder incontinence  Anal cancer          Referral - Internal (Request), 07/16/21 14:23:00 CDT, Referred to: Obstetrics & Gynecology, Referred to: Joanna, Reason for referral: Incontinence. H/O Anal cancer treated with radiation and Chemo., Bladder incontinence  Anal cancer                Bladder incontinence (R32)          1 wonders about anatomic problems and or problems associated with treatment of her anal cancer.  GYN referral.  Urine culture is pending and would be inclined to treat her with a longer course of antibiotics given the recurrence.         Ordered:          61541 physician telephone evaluation 5-10 min (Charge), Quantity: 1, Bladder incontinence  Anal cancer          Referral - Internal (Request), 07/16/21 14:23:00 CDT, Referred to: Obstetrics & Gynecology, Referred to: Joanna, Reason for referral: Incontinence. H/O Anal cancer treated with radiation and Chemo., Bladder  incontinence  Anal cancer                Orders:         Urinalysis (Request), Priority: STAT, Hematuria  Patient Information     Name:ESTEBAN LEROY      Address:      [address] [id number]     Sex:Female     YOB: 1940     [phone]     MRN:[id number]     FIN:[id number]     Location:[facility]     Date of Service:07/16/2021      Primary Care Physician:       Hugh Torres MD, [phone]      Attending Physician:       Hugh Torres MD, [phone]  Problem List/Past Medical History    Ongoing     Allergic Rhinitis     Anal cancer     Bladder incontinence     Bunion of great toe of left foot     Colon cancer     HTN, goal below 130/80     Hypertension     Nephrolithiasis     Osteoporosis     Pancreatitis     Peripheral vascular disease     Pressure ulcer of left foot, stage 2     Tobacco user     VITREAL HEMORRHAGE RIGHT EYE    Historical     No qualifying data  Procedure/Surgical History     Colonoscopy  Medications    cyclobenzaprine, Oral    Dulcolax Stool Softener, 100 mg, Oral, bid    Fish Oil, 1000 mg, Oral    gabapentin 100 mg oral tablet    ipratropium CFC free 17 mcg/inh inhalation aerosol, 2 puff(s), Inhale, qid, 5 refills    lisinopril 20 mg oral tablet, 20 mg= 1 tab(s), Oral, daily    omeprazole, Oral, daily    oxyCODONE 5 mg oral capsule, 5 mg= 1 cap(s), Oral, q6 hrs, PRN    Reclast 5 mg/100 mL intravenous solution, See Instructions    senna oral tablet, 2 tab(s), Oral, daily    Vitamin D3 1000 intl units oral capsule, 1000 International Unit= 1 cap(s), Oral, daily  Allergies    trospium (Swelling of hand)  Social History    Smoking Status     Current every day smoker     Electronic Cigarette/Vaping      Electronic Cigarette Use: Never.     Exercise - Regular exercise     Tobacco - Current      Smoker, current status unknown  Family History    Cancer: Mother.    Heart attack: Father.    Heart disease: Father.  Lab Results          Lab  Results (Last 4 results within 90 days)           Sodium Level: 138 mmol/L [135 mmol/L - 146 mmol/L] (06/14/21 12:31:00)          Potassium Level: 4.4 mmol/L [3.5 mmol/L - 5.3 mmol/L] (06/14/21 12:31:00)          Chloride Level: 102 mmol/L [98 mmol/L - 110 mmol/L] (06/14/21 12:31:00)          CO2 Level: 29 mmol/L [20 mmol/L - 32 mmol/L] (06/14/21 12:31:00)          Glucose Level: 101 mg/dL High [65 mg/dL - 99 mg/dL] (06/14/21 12:31:00)          BUN: 16 mg/dL [7 mg/dL - 25 mg/dL] (06/14/21 12:31:00)          Creatinine Level: 0.63 mg/dL [0.6 mg/dL - 0.88 mg/dL] (06/14/21 12:31:00)          BUN/Creat Ratio: NOT APPLICABLE [6  - 22] (06/14/21 12:31:00)          eGFR: 84 mL/min/1.73m2 (06/14/21 12:31:00)          eGFR African American: 97 mL/min/1.73m2 (06/14/21 12:31:00)          Calcium Level: 9.9 mg/dL [8.6 mg/dL - 10.4 mg/dL] (06/14/21 12:31:00)          WBC: 8.9 [3.8  - 10.8] (06/14/21 12:31:00)          RBC: 4.79 [3.8  - 5.1] (06/14/21 12:31:00)          Hgb: 14.4 gm/dL [11.7 gm/dL - 15.5 gm/dL] (06/14/21 12:31:00)          Hct: 43.9 % [35 % - 45 %] (06/14/21 12:31:00)          MCV: 91.6 fL [80 fL - 100 fL] (06/14/21 12:31:00)          MCH: 30.1 pg [27 pg - 33 pg] (06/14/21 12:31:00)          MCHC: 32.8 gm/dL [32 gm/dL - 36 gm/dL] (06/14/21 12:31:00)          RDW: 12.7 % [11 % - 15 %] (06/14/21 12:31:00)          Platelet: 392 [140  - 400] (06/14/21 12:31:00)          MPV: 8.7 fL [7.5 fL - 12.5 fL] (06/14/21 12:31:00)          Lymphocytes: 23.1 % (06/14/21 12:31:00)          Abs Lymphocytes: 2056 [850  - 3900] (06/14/21 12:31:00)          Neutrophils: 65.6 % (06/14/21 12:31:00)          Abs Neutrophils: 5838 [1500  - 7800] (06/14/21 12:31:00)          Monocytes: 6.6 % (06/14/21 12:31:00)          Abs Monocytes: 587 [200  - 950] (06/14/21 12:31:00)          Eosinophils: 3.7 % (06/14/21 12:31:00)          Abs Eosinophils: 329 [15  - 500] (06/14/21 12:31:00)          Basophils: 1 % (06/14/21 12:31:00)          Abs  Basophils: 89 [0  - 200] (06/14/21 12:31:00)          Fecal Globin: See comment (06/23/21 14:18:00)          Urine Culture: See comment Abnormal (06/14/21 12:31:00)  Immunizations          Scheduled Immunizations          Dose Date(s)          influenza virus vaccine, inactivated          11/10/2005, 10/26/2006, 09/14/2009, 09/29/2010, 12/06/2012, 09/22/2014, 12/02/2015, 12/20/2016, 09/25/2017, 09/26/2018          pneumococcal (PCV13)          12/20/2016          pneumococcal (PPSV23)          01/25/2012          Td          01/25/2012, 01/01/2011          Other Immunizations          influenza virus vaccine, inactivated          12/30/2011

## 2022-02-16 NOTE — PROGRESS NOTES
Patient:   ESTEBAN LEROY            MRN: [id number]            FIN: [id number]               Age:   79 years     Sex:  Female     :  1940   Associated Diagnoses:   CHESTER (acute respiratory infection)   Author:   Senthil Duran MD      Impression and Plan   Diagnosis     CHESTER (acute respiratory infection) (YHC03-DX J22).     Course:  Worsening.    Orders     Orders   Charges (Evaluation and Management):  60831 office outpatient visit 15 minutes (Charge) (Order): Quantity: 1, CHESTER (acute respiratory infection).     Orders (Selected)   Prescriptions  Prescribed  azithromycin 250 mg oral tablet: = 1 tab(s) ( 250 mg ), PO, Daily, # 7 tab(s), 0 Refill(s), Type: Maintenance, Pharmacy: [facility], 1 tab(s) Oral daily,x7 day(s).        Visit Information      Date of Service: 2019 02:09 pm  Performing Location: [facility]  Encounter#: [id number]      Primary Care Provider (PCP):  NONE ,    Visit type:  New symptom.    Accompanied by:  No one.    Source of history:  Self.    History limitation:  None.       Chief Complaint   Chief complaint discussed and confirmed correct.     2019 2:13 PM CST   Pt here for fever        History of Present Illness             The patient presents with cough.  The cough is described as paroxysmal and productive yellow sputum.  The severity of the cough is moderate.  The cough is constant and is worsening.  The cough has lasted for 3 day(s).  The context of the cough: occurred in association with illness.  There are no modifying factors.  Associated symptoms consist of chills, fever, nasal congestion, denies chest pain, denies shortness of breath and denies sore throat.        Review of Systems   Constitutional:  Negative.    Eye:  Negative.    Ear/Nose/Mouth/Throat:  Negative.    Respiratory:  Cough.    Cardiovascular:  Negative.    Gastrointestinal:  Negative.    Genitourinary:  Negative.    Hematology/Lymphatics:  Negative.    Endocrine:  Negative.     Immunologic:  Negative.    Musculoskeletal:  Negative.    Integumentary:  Negative.    Neurologic:  Negative.    Psychiatric:  Negative.    All other systems reviewed and negative      Health Status   Allergies:    Allergic Reactions (Selected)  Moderate  Trospium (Swelling of hand)   Medications:  (Selected)   Prescriptions  Prescribed  azithromycin 250 mg oral tablet: = 1 tab(s) ( 250 mg ), PO, Daily, # 7 tab(s), 0 Refill(s), Type: Maintenance, Pharmacy: [facility], 1 tab(s) Oral daily,x7 day(s)  ipratropium CFC free 17 mcg/inh inhalation aerosol: 2 puff(s), inh, qid, # 1 EA, 5 Refill(s), Type: Maintenance, Pharmacy: [facility], 2 puff(s) inh qid  Documented Medications  Documented  Fish Oil: ( 1,000 mg ), PO, 0 Refill(s), Type: Soft Stop  Vitamin D3 1000 intl units oral capsule: 1 cap(s) ( 1,000 International Unit ), PO, daily, 0 Refill(s), Type: Soft Stop  cyclobenzaprine: Oral, 0 Refill(s), Type: Maintenance  gabapentin 100 mg oral tablet: 0 Refill(s), Type: Maintenance  lisinopril 20 mg oral tablet: 1 tab(s) ( 20 mg ), po, daily, 0 Refill(s)  omeprazole: PO, daily, 0 Refill(s), Type: Soft Stop  senna oral tablet: 2 tab(s), po, daily, 0 Refill(s), Type: Maintenance   Problem list:    All Problems  Allergic Rhinitis / ICD-9-.9 / Confirmed  Bunion of great toe of left foot / SNOMED CT 6176805445 / Confirmed  Colon cancer / ICD-9-.9 / Confirmed  Hypertension / ICD-9-.9 / Confirmed  Nephrolithiasis / SNOMED CT 475326674 / Confirmed  Pancreatitis / ICD-9-.0 / Confirmed  Peripheral vascular disease / SNOMED CT 9388869995 / Confirmed  Pressure ulcer of left foot, stage 2 / SNOMED CT 3980866070 / Confirmed  VITREAL HEMORRHAGE RIGHT EYE / Confirmed      Histories   Past Medical History:    Active  Hypertension (401.9)  Allergic Rhinitis (477.9)  Colon cancer (153.9)   Family History:    Cancer  Mother (Lian, )  Heart disease  Father (Partha, )  Heart  attack  Father (Partha, )     Procedure history:    No active procedure history items have been selected or recorded.   Social History:        Tobacco Assessment: Current            Current, Cigarettes, 10 per day.      Exercise and Physical Activity Assessment: Regular exercise        Physical Examination   Vital signs reviewed  and within acceptable limits    Vital Signs   2019 2:13 PM CST Temperature Tympanic 99.5 DegF    Peripheral Pulse Rate 65 bpm    Oxygen Saturation 96 %      Measurements from flowsheet : Measurements   2019 2:13 PM CST Height Measured - Standard 66 in    Weight Measured - Standard 97 lb    BSA 1.43 m2    Body Mass Index 15.65 kg/m2  LOW      General:  Alert and oriented, No acute distress.    Eye:  Pupils are equal, round and reactive to light, Extraocular movements are intact, Normal conjunctiva.    HENT:  Normocephalic, Tympanic membranes are clear, Normal hearing, Oral mucosa is moist, No pharyngeal erythema, No sinus tenderness.    Neck:  Supple, Non-tender, No lymphadenopathy, No thyromegaly.    Respiratory:  Lungs are clear to auscultation, Respirations are non-labored, Breath sounds are equal, demonstrates frequent loose cough.    Cardiovascular:  Normal rate, Regular rhythm, No murmur, Normal peripheral perfusion, No edema.    Integumentary:  Warm, Dry, Pink.    Psychiatric:  Cooperative, Appropriate mood & affect, Normal judgment.

## 2022-02-16 NOTE — LETTER
(Inserted Image. Unable to display)   June 02, 2021  ESTEBAN LEROY  [address] [place] [address]          Dear ESTEBAN,      Thank you for selecting [facility] for your healthcare needs.    Our records indicate you are due for the following services:     Annual Wellness Visit and to Establish care with a new primary care provider    (FYI   Regarding office visits: In some instances, a video visit or telephone visit may be offered as an option.)        To schedule an appointment or if you have further questions, please contact your clinic at [phone].      Powered by exoro system    Sincerely,    Rhianna Aden M.D.

## 2022-02-16 NOTE — PROGRESS NOTES
Patient:   ESTEBAN LEROY            MRN: [id number]            FIN: [id number]               Age:   79 years     Sex:  Female     :  1940   Associated Diagnoses:   Acute pansinusitis   Author:   Senthil Duran MD      Impression and Plan   Diagnosis     Acute pansinusitis (NLA85-RA J01.40).     Course:  Worsening.    Orders     Orders   Charges (Evaluation and Management):  92085 office outpatient visit 15 minutes (Charge) (Order): Quantity: 1, Acute pansinusitis.     Orders (Selected)   Prescriptions  Prescribed  Augmentin 875 mg-125 mg oral tablet: = 1 tab(s), po, bidac, # 20 tab(s), 0 Refill(s), Type: Maintenance, Pharmacy: [facility], 1 tab(s) Oral bidac,x10 day(s).        Visit Information      Date of Service: 2019 02:09 pm  Performing Location: [facility]  Encounter#: [id number]      Primary Care Provider (PCP):  NONE ,    Visit type:  New symptom.    Accompanied by:  No one.    Source of history:  Self.    History limitation:  None.       Chief Complaint   Chief complaint discussed and confirmed correct.     2019 2:22 PM CDT    Pt here for cough        History of Present Illness             The patient presents with sinus problem.  The sinus problem is located in the frontal sinus, ethmoid sinus and maxillary sinus.  The sinus problem is characterized by nasal congestion, headache and facial pain.  The severity of the sinus problem is severe.  The sinus problem is constant.  The sinus problem has lasted for 1 week(s).  The context of the sinus problem: occurred in association with illness.  Associated symptoms consist of fever and cough.        Review of Systems   Constitutional:  Negative.    Eye:  Negative.    Ear/Nose/Mouth/Throat:  Nasal congestion, Sinus pain.    Respiratory:  Negative.    Cardiovascular:  Negative.    Gastrointestinal:  Negative.    Genitourinary:  Negative.    Hematology/Lymphatics:  Negative.    Endocrine:  Negative.    Immunologic:   Negative.    Musculoskeletal:  Negative.    Integumentary:  Negative.    Neurologic:  Negative.    Psychiatric:  Negative.    All other systems reviewed and negative      Health Status   Allergies:    Allergic Reactions (Selected)  Moderate  Trospium (Swelling of hand)   Medications:  (Selected)   Prescriptions  Prescribed  Augmentin 875 mg-125 mg oral tablet: = 1 tab(s), po, bidac, # 20 tab(s), 0 Refill(s), Type: Maintenance, Pharmacy: [facility], 1 tab(s) Oral bidac,x10 day(s)  ipratropium CFC free 17 mcg/inh inhalation aerosol: 2 puff(s), inh, qid, # 1 EA, 5 Refill(s), Type: Maintenance, Pharmacy: [facility], 2 puff(s) inh qid  Documented Medications  Documented  Fish Oil: ( 1,000 mg ), PO, 0 Refill(s), Type: Soft Stop  Vitamin D3 1000 intl units oral capsule: 1 cap(s) ( 1,000 International Unit ), PO, daily, 0 Refill(s), Type: Soft Stop  gabapentin 100 mg oral tablet: 0 Refill(s), Type: Maintenance  lisinopril 20 mg oral tablet: 1 tab(s) ( 20 mg ), po, daily, 0 Refill(s)  omeprazole: PO, daily, 0 Refill(s), Type: Soft Stop  oxycodone 5 mg oral tablet: 1 tab(s) ( 5 mg ), PO, q4 hrs, 0 Refill(s), Type: Soft Stop  senna oral tablet: 2 tab(s), po, daily, 0 Refill(s), Type: Maintenance   Problem list:    All Problems  Allergic Rhinitis / ICD-9-.9 / Confirmed  Bunion of great toe of left foot / SNOMED CT 5428422577 / Confirmed  Colon cancer / ICD-9-.9 / Confirmed  Hypertension / ICD-9-.9 / Confirmed  Nephrolithiasis / SNOMED CT 430021300 / Confirmed  Pancreatitis / ICD-9-.0 / Confirmed  Peripheral vascular disease / SNOMED CT 5878621182 / Confirmed  Pressure ulcer of left foot, stage 2 / SNOMED CT 7867078361 / Confirmed  VITREAL HEMORRHAGE RIGHT EYE / Confirmed      Histories   Past Medical History:    Active  Hypertension (401.9)  Allergic Rhinitis (477.9)  Colon cancer (153.9)   Family History:    Cancer  Mother (Lian, )  Heart disease  Father (Partha,  )  Heart attack  Father (Partha, )     Procedure history:    No active procedure history items have been selected or recorded.   Social History:        Tobacco Assessment: Current            Current, Cigarettes, 10 per day.      Exercise and Physical Activity Assessment: Regular exercise      Physical Examination   Vital signs reviewed  and within acceptable limits    Vital Signs   2019 2:22 PM CDT Temperature Tympanic 98.7 DegF    Peripheral Pulse Rate 91 bpm    Systolic Blood Pressure 124 mmHg    Diastolic Blood Pressure 80 mmHg    Mean Arterial Pressure 95 mmHg    Oxygen Saturation 88 %  LOW      Measurements from flowsheet : Measurements   2019 2:22 PM CDT Height Measured - Standard 66 in    Weight Measured - Standard 97.6 lb    BSA 1.43 m2    Body Mass Index 15.75 kg/m2  LOW      General:  Alert and oriented, No acute distress.    Eye:  Pupils are equal, round and reactive to light, Extraocular movements are intact, Normal conjunctiva.    HENT:  Normocephalic, Tympanic membranes are clear, Normal hearing, Oral mucosa is moist, No pharyngeal erythema.         Sinus: Bilateral, Ethmoid sinus, Frontal sinus, Maxillary sinus, Tenderness.    Neck:  Supple, Non-tender, No lymphadenopathy.    Respiratory:  Lungs are clear to auscultation, Respirations are non-labored, Breath sounds are equal.    Cardiovascular:  Normal rate, Regular rhythm, No murmur, Normal peripheral perfusion, No edema.    Integumentary:  Warm, Dry, Pink.    Psychiatric:  Cooperative, Appropriate mood & affect, Normal judgment.

## 2022-02-16 NOTE — NURSING NOTE
Comprehensive Intake Entered On:  3/5/2019 1:27 PM CST    Performed On:  3/5/2019 1:22 PM CST by Shalini Pineda CMA               Summary   Chief Complaint :   Pt here for a sore on her foot   Weight Measured :   102 lb(Converted to: 102 lb 0 oz, 46.27 kg)    Height Measured :   66 in(Converted to: 5 ft 6 in, 167.64 cm)    Body Mass Index :   16.46 kg/m2 (LOW)    Body Surface Area :   1.47 m2   Systolic Blood Pressure :   102 mmHg   Diastolic Blood Pressure :   68 mmHg   Mean Arterial Pressure :   79 mmHg   Peripheral Pulse Rate :   116 bpm (HI)    BP Site :   Right arm   Oxygen Saturation :   92 % (LOW)    Shalini Pineda CMA - 3/5/2019 1:22 PM CST   Health Status   Allergies Verified? :   Yes   Medication History Verified? :   Yes   Medical History Verified? :   Yes   Pre-Visit Planning Status :   Not completed   Tobacco Use? :   Current every day smoker   Shalini Pineda CMA - 3/5/2019 1:22 PM CST   Consents   Consent for Immunization Exchange :   Consent Granted   Consent for Immunizations to Providers :   Consent Granted   Shalini Pineda CMA - 3/5/2019 1:22 PM CST   Meds / Allergies   (As Of: 3/5/2019 1:27:05 PM CST)   Allergies (Active)   trospium  Estimated Onset Date:   Unspecified ; Reactions:   Swelling of hand ; Created By:   Kadi Ko RN; Reaction Status:   Active ; Category:   Drug ; Substance:   trospium ; Type:   Allergy ; Severity:   Moderate ; Updated By:   Kadi Ko RN; Reviewed Date:   3/5/2019 1:25 PM CST        Medication List   (As Of: 3/5/2019 1:27:05 PM CST)   Prescription/Discharge Order    ipratropium  :   ipratropium ; Status:   Prescribed ; Ordered As Mnemonic:   ipratropium CFC free 17 mcg/inh inhalation aerosol ; Simple Display Line:   2 puff(s), inh, qid, 1 EA, 5 Refill(s) ; Ordering Provider:   Senthil Duran MD; Catalog Code:   ipratropium ; Order Dt/Tm:   12/29/2016 4:19:08 PM            Home Meds    cholecalciferol  :   cholecalciferol ; Status:   Documented ;  Ordered As Mnemonic:   Vitamin D3 1000 intl units oral capsule ; Simple Display Line:   1,000 International Unit, 1 cap(s), PO, daily ; Catalog Code:   cholecalciferol ; Order Dt/Tm:   7/24/2012 2:30:03 PM          gabapentin  :   gabapentin ; Status:   Documented ; Ordered As Mnemonic:   gabapentin 100 mg oral tablet ; Catalog Code:   gabapentin ; Order Dt/Tm:   1/7/2013 11:32:55 AM          lisinopril  :   lisinopril ; Status:   Documented ; Ordered As Mnemonic:   lisinopril 20 mg oral tablet ; Simple Display Line:   20 mg, 1 tab(s), po, daily ; Catalog Code:   lisinopril ; Order Dt/Tm:   9/15/2010 11:31:32 AM          omega-3 polyunsaturated fatty acids  :   omega-3 polyunsaturated fatty acids ; Status:   Documented ; Ordered As Mnemonic:   Fish Oil ; Simple Display Line:   1,000 mg, PO ; Catalog Code:   omega-3 polyunsaturated fatty acids ; Order Dt/Tm:   7/24/2012 2:30:31 PM          omeprazole  :   omeprazole ; Status:   Documented ; Ordered As Mnemonic:   omeprazole ; Simple Display Line:   PO, daily ; Catalog Code:   omeprazole ; Order Dt/Tm:   7/24/2012 2:27:58 PM          oxycodone  :   oxycodone ; Status:   Documented ; Ordered As Mnemonic:   oxycodone 5 mg oral tablet ; Simple Display Line:   5 mg, 1 tab(s), PO, q4 hrs ; Catalog Code:   oxyCODONE ; Order Dt/Tm:   7/24/2012 2:28:24 PM          senna  :   senna ; Status:   Documented ; Ordered As Mnemonic:   senna oral tablet ; Simple Display Line:   2 tab(s), po, daily ; Catalog Code:   senna ; Order Dt/Tm:   1/7/2013 11:33:17 AM

## 2022-02-16 NOTE — NURSING NOTE
Comprehensive Intake Entered On:  8/9/2021 1:49 PM CDT    Performed On:  8/9/2021 1:44 PM CDT by Diane Mason               Summary   Chief Complaint :   Patient here today for medical f/u.   Weight Measured :   93.7 lb(Converted to: 93 lb 11 oz, 42.502 kg)    Systolic Blood Pressure :   132 mmHg (HI)    Diastolic Blood Pressure :   67 mmHg   Mean Arterial Pressure :   89 mmHg   Peripheral Pulse Rate :   74 bpm   BP Site :   Right arm   BP Method :   Electronic   HR Method :   Electronic   Temperature Tympanic :   99.6 DegF(Converted to: 37.6 DegC)    Diane Mason - 8/9/2021 1:44 PM CDT   Health Status   Allergies Verified? :   Yes   Medication History Verified? :   Yes   Medical History Verified? :   Yes   Pre-Visit Planning Status :   Completed   Tobacco Use? :   Current every day smoker   Diane Mason - 8/9/2021 1:44 PM CDT   Consents   Consent for Immunization Exchange :   Consent Granted   Consent for Immunizations to Providers :   Consent Granted   Diane Mason - 8/9/2021 1:44 PM CDT   Meds / Allergies   (As Of: 8/9/2021 1:49:41 PM CDT)   Allergies (Active)   trospium  Estimated Onset Date:   Unspecified ; Reactions:   Swelling of hand ; Created By:   Kadi Ko RN; Reaction Status:   Active ; Category:   Drug ; Substance:   trospium ; Type:   Allergy ; Severity:   Moderate ; Updated By:   Kadi Ko RN; Reviewed Date:   7/16/2021 2:00 PM CDT        Medication List   (As Of: 8/9/2021 1:49:41 PM CDT)   Home Meds    zoledronic acid  :   zoledronic acid ; Status:   Documented ; Ordered As Mnemonic:   Reclast 5 mg/100 mL intravenous solution ; Simple Display Line:   See Instructions, 5 mg IV annually. Last dose 10/28/2020, 0 Refill(s) ; Catalog Code:   zoledronic acid ; Order Dt/Tm:   6/14/2021 12:55:07 PM CDT          senna  :   senna ; Status:   Documented ; Ordered As Mnemonic:   senna oral tablet ; Simple Display Line:   2 tab(s), po, daily ; Catalog Code:   senna ; Order  Dt/Tm:   1/7/2013 11:33:17 AM CST          oxyCODONE  :   oxyCODONE ; Status:   Documented ; Ordered As Mnemonic:   oxyCODONE 5 mg oral capsule ; Simple Display Line:   5 mg, 1 cap(s), Oral, q6 hrs, PRN: for pain, 0 Refill(s) ; Catalog Code:   oxyCODONE ; Order Dt/Tm:   1/9/2020 9:56:36 AM CST          omeprazole  :   omeprazole ; Status:   Documented ; Ordered As Mnemonic:   omeprazole 20 mg oral delayed release capsule ; Simple Display Line:   20 mg, 1 cap(s), Oral, bid, 90 cap(s), 0 Refill(s) ; Catalog Code:   omeprazole ; Order Dt/Tm:   7/28/2021 1:13:25 PM CDT          omega-3 polyunsaturated fatty acids  :   omega-3 polyunsaturated fatty acids ; Status:   Documented ; Ordered As Mnemonic:   Fish Oil ; Simple Display Line:   1,000 mg, PO ; Catalog Code:   omega-3 polyunsaturated fatty acids ; Order Dt/Tm:   7/24/2012 2:30:31 PM CDT          lisinopril  :   lisinopril ; Status:   Documented ; Ordered As Mnemonic:   lisinopril 10 mg oral tablet ; Simple Display Line:   10 mg, 1 tab(s), Oral, daily, 90 tab(s), 0 Refill(s) ; Catalog Code:   lisinopril ; Order Dt/Tm:   7/28/2021 1:12:23 PM CDT          ketoconazole topical  :   ketoconazole topical ; Status:   Documented ; Ordered As Mnemonic:   ketoconazole 2% topical cream ; Simple Display Line:   1 ignacio, Topical, daily, 15 gm, 0 Refill(s) ; Catalog Code:   ketoconazole topical ; Order Dt/Tm:   7/28/2021 1:12:11 PM CDT          gabapentin  :   gabapentin ; Status:   Documented ; Ordered As Mnemonic:   gabapentin 300 mg oral capsule ; Simple Display Line:   600 mg, 2 cap(s), Oral, tid, 0 Refill(s) ; Catalog Code:   gabapentin ; Order Dt/Tm:   7/28/2021 1:11:34 PM CDT          cyclobenzaprine  :   cyclobenzaprine ; Status:   Documented ; Ordered As Mnemonic:   cyclobenzaprine ; Simple Display Line:   5 mg, Oral, hs, 0 Refill(s) ; Catalog Code:   cyclobenzaprine ; Order Dt/Tm:   12/19/2019 2:44:36 PM CST          cholecalciferol  :   cholecalciferol ; Status:    Documented ; Ordered As Mnemonic:   Vitamin D3 1000 intl units oral capsule ; Simple Display Line:   1,000 International Unit, 1 cap(s), PO, daily ; Catalog Code:   cholecalciferol ; Order Dt/Tm:   7/24/2012 2:30:03 PM CDT          amLODIPine  :   amLODIPine ; Status:   Documented ; Ordered As Mnemonic:   amLODIPine 5 mg oral tablet ; Simple Display Line:   5 mg, 1 tab(s), Oral, daily, 90 tab(s), 0 Refill(s) ; Catalog Code:   amLODIPine ; Order Dt/Tm:   7/28/2021 1:09:58 PM CDT

## 2022-02-16 NOTE — PROGRESS NOTES
Chief Complaint  passed kidney stone 2 days ago, having back pain  History of Present Illness  Radha complains of 2 days of dysuria, frequency and urgency. ?No fevers chills nausea vomiting abdominal pain. ?She tells me she recently passed a couple of kidney stones and was to see Dr. Long had an ultrasound which she says turned out well. ?Patient is a somewhat vague historian.  Review of Systems  No chest pain or dyspnea.  Problem List/Past Medical History  Ongoing  Allergic Rhinitis  Colon cancer  Hypertension  Nephrolithiasis  Pancreatitis  VITREAL HEMORRHAGE RIGHT EYE  Resolved  No resolved problems  Procedure/Surgical History  Home Medications  Cipro 500 mg oral tablet, 500 mg, 1 tab(s), po, q12 hrs  Fish Oil, 1000 mg, po  gabapentin 100 mg oral tablet  ipratropium CFC free 17 mcg/inh inhalation aerosol, 2 puff(s), inh, qid, 5 refills  lisinopril 20 mg oral tablet, 20 mg, 1 tab(s), po, daily  omeprazole, po, daily  oxycodone 5 mg oral tablet, 5 mg, 1 tab(s), po, q4 hrs  senna oral tablet, 2 tab(s), po, daily  Vitamin D3 1000 intl units oral capsule, 1000 International Unit, 1 cap(s), po, daily  Allergies  trospium?(Swelling of hand)  Social History  Family History  Cancer: Mother.  Heart attack: Father.  Heart disease: Father.  Immunizations  Physical Exam  Vitals & Measurements  HR:?75?(Peripheral)?  BP:?130/64?  HT:?66?in?  WT:?116?lb?  BMI:?18.72?  Patient appears comfortable and is in no distress. ?Alert and oriented. ?Chest clear. ?Cardiac exam regular. ?Extremities have no edema. ?Abdomen nontender bowel sounds normal. ?A little bit of CVA tenderness which seems to be bilateral but no spinal tenderness.  Lab Results  UA reveals moderate leukocyte esterase positive nitrates small occult blood 3-5 red cells 26-50 white cells and moderate bacteria  Diagnostic Results  Assessment/Plan  Acute UTI  Patient with lower UTI symptoms predominantly and abnormal UA.  Will start patient on Cipro 500 mg twice  daily while awaiting the urine culture. ?I suggested follow-up with Dr. Duran next week.  Ordered:  ciprofloxacin, 1 tab(s) ( 500 mg ), po, q12 hrs, # 6 tab(s), 0 Refill(s), Type: Maintenance, Pharmacy: [facility], 1 tab(s) po q12 hrs,x3 day(s)  Culture, Urine, Routine* ([facility])  Nephrolithiasis  I suggested follow-up with Dr. Duran next week to review this workup with Dr. Long, which is not available to me currently.  Orders:  Return to Clinic (Request)

## 2022-02-16 NOTE — TELEPHONE ENCOUNTER
---------------------  From: Hugh Torres MD   To: [facility] Message Pool ([id number] - [place]);     Sent: 6/14/2021 2:39:21 PM CDT  Subject: Patient Message - Results Notification   Actions: Notify the patient with results, Notify the pharmacy with prescription(s)      UA shows UTI. Keflex 500 mg TID for 7 days.Tried to call pt at 1456. No answer and no VM set upPatient notified and medication sent in.

## 2022-02-16 NOTE — TELEPHONE ENCOUNTER
---------------------  From: Celina Curiel CMA   Sent: 7/16/2021 8:33:52 AM CDT  Subject: U/A results     PCP:  FREDIS     Time of Call: 825am       Person Calling:  Pt  Phone number:  [phone]      Note:   Pt calls requesting u/a results from yesterday. I reviewed FREDIS's results letter that said u/s does show possible UTI but he wanted to wait for cx results to return. I advised this to pt. She said she did have an appt w/ FREDIS this AM but had to cancel as she was not feeling up to driving to the clinic. I offered a phone vs video visit this afternoon w/ FREDIS as she wants to discuss her urine results w/ him. She was transferred to schedule and is going to see if she can find a ride to the clinic as she prefers face to face but knows virtual visit is also an option.

## 2022-02-16 NOTE — LETTER
(Inserted Image. Unable to display)       [address] [place] [address]    July 15, 2021        ESTEBAN LEROY  [address] [place] [address]        Dear ESTEBAN,      Thank you for selecting [facility] for your healthcare needs.       Urinalysis shows a possible UTI. Will wait for culture results.          Please contact me or my assistant at [phone] you have any questions or concerns.     Sincerely,        Hugh Torres MD

## 2022-02-16 NOTE — LETTER
(Inserted Image. Unable to display)       [address] [place] [address]  June 24, 2021      ESTEBAN LEROY      [address] [place] [address]        Dear ESTEBAN,    Thank you for selecting [facility] for your healthcare needs.  Below you will find the results of your recent tests done at our clinic.     NO blood in stool sample.      Result Name Current Result   Fecal Globin  See comment 6/23/2021       Please contact me or my assistant at [phone] if you have any questions.     Sincerely,        Hugh Torres MD

## 2022-02-16 NOTE — NURSING NOTE
Comprehensive Intake Entered On:  12/19/2019 2:47 PM CST    Performed On:  12/19/2019 2:40 PM CST by Shalini Pineda CMA               Summary   Chief Complaint :   Pt here with bowel problems   Weight Measured :   97 lb(Converted to: 97 lb 0 oz, 44.00 kg)    Height Measured :   66 in(Converted to: 5 ft 6 in, 167.64 cm)    Body Mass Index :   15.65 kg/m2 (LOW)    Body Surface Area :   1.43 m2   Systolic Blood Pressure :   132 mmHg (HI)    Diastolic Blood Pressure :   82 mmHg (HI)    Mean Arterial Pressure :   99 mmHg   Peripheral Pulse Rate :   68 bpm   Oxygen Saturation :   97 %   Shalini Pineda CMA - 12/19/2019 2:40 PM CST   Health Status   Allergies Verified? :   Yes   Medication History Verified? :   Yes   Medical History Verified? :   Yes   Pre-Visit Planning Status :   Completed   Tobacco Use? :   Current every day smoker   Shalini Pineda CMA - 12/19/2019 2:40 PM CST   Consents   Consent for Immunization Exchange :   Consent Granted   Consent for Immunizations to Providers :   Consent Granted   Shalini Pineda CMA - 12/19/2019 2:40 PM CST   Meds / Allergies   (As Of: 12/19/2019 2:47:39 PM CST)   Allergies (Active)   trospium  Estimated Onset Date:   Unspecified ; Reactions:   Swelling of hand ; Created By:   Kadi Ko RN; Reaction Status:   Active ; Category:   Drug ; Substance:   trospium ; Type:   Allergy ; Severity:   Moderate ; Updated By:   Kadi Ko RN; Reviewed Date:   12/19/2019 2:44 PM CST        Medication List   (As Of: 12/19/2019 2:47:39 PM CST)   Prescription/Discharge Order    amoxicillin-clavulanate  :   amoxicillin-clavulanate ; Status:   Completed ; Ordered As Mnemonic:   Augmentin 875 mg-125 mg oral tablet ; Simple Display Line:   1 tab(s), po, bidac, for 10 day(s), 20 tab(s), 0 Refill(s) ; Ordering Provider:   Senthil Duran MD; Catalog Code:   amoxicillin-clavulanate ; Order Dt/Tm:   5/29/2019 2:50:31 PM CDT          ipratropium  :   ipratropium ; Status:   Prescribed  ; Ordered As Mnemonic:   ipratropium CFC free 17 mcg/inh inhalation aerosol ; Simple Display Line:   2 puff(s), inh, qid, 1 EA, 5 Refill(s) ; Ordering Provider:   Senthil Duran MD; Catalog Code:   ipratropium ; Order Dt/Tm:   12/29/2016 4:19:08 PM CST            Home Meds    cholecalciferol  :   cholecalciferol ; Status:   Documented ; Ordered As Mnemonic:   Vitamin D3 1000 intl units oral capsule ; Simple Display Line:   1,000 International Unit, 1 cap(s), PO, daily ; Catalog Code:   cholecalciferol ; Order Dt/Tm:   7/24/2012 2:30:03 PM CDT          cyclobenzaprine  :   cyclobenzaprine ; Status:   Documented ; Ordered As Mnemonic:   cyclobenzaprine ; Simple Display Line:   Oral, 0 Refill(s) ; Catalog Code:   cyclobenzaprine ; Order Dt/Tm:   12/19/2019 2:44:36 PM CST          gabapentin  :   gabapentin ; Status:   Documented ; Ordered As Mnemonic:   gabapentin 100 mg oral tablet ; Catalog Code:   gabapentin ; Order Dt/Tm:   1/7/2013 11:32:55 AM CST          lisinopril  :   lisinopril ; Status:   Documented ; Ordered As Mnemonic:   lisinopril 20 mg oral tablet ; Simple Display Line:   20 mg, 1 tab(s), po, daily ; Catalog Code:   lisinopril ; Order Dt/Tm:   9/15/2010 11:31:32 AM CDT          omega-3 polyunsaturated fatty acids  :   omega-3 polyunsaturated fatty acids ; Status:   Documented ; Ordered As Mnemonic:   Fish Oil ; Simple Display Line:   1,000 mg, PO ; Catalog Code:   omega-3 polyunsaturated fatty acids ; Order Dt/Tm:   7/24/2012 2:30:31 PM CDT          omeprazole  :   omeprazole ; Status:   Documented ; Ordered As Mnemonic:   omeprazole ; Simple Display Line:   PO, daily ; Catalog Code:   omeprazole ; Order Dt/Tm:   7/24/2012 2:27:58 PM CDT          oxycodone  :   oxycodone ; Status:   Documented ; Ordered As Mnemonic:   oxycodone 5 mg oral tablet ; Simple Display Line:   5 mg, 1 tab(s), PO, q4 hrs ; Catalog Code:   oxyCODONE ; Order Dt/Tm:   7/24/2012 2:28:24 PM CDT          joseph  :   joseph ; Status:    Documented ; Ordered As Mnemonic:   senna oral tablet ; Simple Display Line:   2 tab(s), po, daily ; Catalog Code:   senna ; Order Dt/Tm:   1/7/2013 11:33:17 AM CST

## 2022-02-16 NOTE — TELEPHONE ENCOUNTER
Entered by Ivett Ryan on August 09, 2021 2:24:48 PM CDT  Noted.      ---------------------  From: Diane Mason ([facility] Message Pool ([id number]_[facility]))   To: Referral Coordinators Pool ([id number]_[facility]);     Sent: 8/9/2021 2:18:58 PM CDT  Subject: General Message     Please call pt on her cell at [phone] for CT and uro referral

## 2022-02-16 NOTE — TELEPHONE ENCOUNTER
---------------------  From: Carie Escalante LPN (Phone Messages Pool ([id number]_[facility]))   To: Advanced Practice Provider La Conner ([id number]_[facility]);     Sent: 1/17/2020 9:51:38 AM CST  Subject: augmentin     Phone Message    PCP:   DOROTHEA      Time of Call:  9:00am       Person Calling:  pt  Phone number:  [phone]    Note:   Pt LM wondering if she should get a refill of her medication because her mouth is still swollen. Pt says she cannot see the specialist until 1/29 and the dentist told her she should remain on mediation if she still has swelling.    Pt says she only has 1 tab left of Augmentin. She would like refill sent to [facility] if she should continue taking.    Please advise. DOROTHEA out of clinic today.    Last office visit and reason:  1/9/2020 Oral Problems with DOROTHEA---------------------  From: Bisi Rivas (Advanced Practice Provider La Conner ([id number]_[facility]))   To: Phone [employer] Pool ([id number]_[place] [facility] [place]);     Sent: 1/17/2020 10:35:49 AM CST  Subject: RE: augmentin     I sent a refill but changed it to amoxicillin, I am afraid she will have significant GI problems if we leave her on Augmentin that long.  as long as the symptoms stay at bay while on Amox, she has a supply till the 1/29. If she is worsening she should follow upPt informed. Pt agrees to follow up if worsening.

## 2022-02-16 NOTE — TELEPHONE ENCOUNTER
---------------------  From: Dotty Lo RN   To: Kamala Lama PA-C;     Cc: Phone Messages Pool ([id number]_[place] [employer] [place]);      Sent: 10/25/2020 9:46:19 AM CDT  Subject: Urine Culture     Please advise on Urine Culture ordered by DOROTHEA kim visit 10/21/20 for dysuria, hematuria, abd pain  No antibiotic prescribed.        CULTURE, URINE, ROUTINE         Micro Number:      [id number]    Test Status:       Final    Specimen Source:   URINE    Specimen Quality:  Adequate    Result:            50,000-100,000 CFU/mL of Enterococcus faecalis      COMMENT:           Additional non-predominating organism(s) isolated.                       These organisms, commonly found on external and                       internal genitalia, are considered colonizers. No                       further testing performed.                              E.faecalis                            ----------------                            INT   JOSE     AMPICILLIN             S     <=2     NITROFURANTOIN         S     <=16     VANCOMYCIN             S     1    S=Susceptible  I=Intermediate  R=Resistant  * = Not Tested  NR = Not Reported  **NN = See Therapy Comments        Results:  Date Result Name Ind Value   10/21/2020 2:54 PM Urine Culture ((A)) See comment---------------------  From: Kamala Lama PA-C   To: Dotty Lo RN; Senthil Duran MD;     Cc: Phone [employer] Pool ([id number]_[place] [facility] [place]);      Sent: 10/25/2020 10:11:34 AM CDT  Subject: RE: Urine Culture     Amoxicillin 875 mg po bid x 7 days to pharmacy of choice.Spoke with patient's daughter who states Radha fell on 10/22/20 and fractured her Femur. She was taken into emergency surgery and is currently admitted to [facility]. Spoke with nurse at [facility] and informed her of culture results.   Faxed results for hospital to address.

## 2022-02-16 NOTE — PROGRESS NOTES
Patient:   ESTEBAN LEROY            MRN: [id number]            FIN: [id number]               Age:   77 years     Sex:  Female     :  1940   Associated Diagnoses:   H/O cystitis   Author:   Senthil Duran MD      Impression and Plan   Diagnosis     H/O cystitis (ZLH92-ZQ Z87.440).     Course:  Improving, resolved.    Orders     Orders   Charges (Evaluation and Management):  70455 office outpatient visit 10 minutes (Charge) (Order): Quantity: 1, H/O cystitis.        Visit Information   Visit type:  Scheduled follow-up.    Accompanied by:  No one.    Source of history:  Self.    History limitation:  None.       Chief Complaint   Chief complaint discussed and confirmed correct.     2017 10:47 AM CDT    follow up UTI is feeling better. Has one tab of antibiotic left.        History of Present Illness             The patient presents with UTI symptoms resolved.  There are no modifying factors.  Associated symptoms consist of flank pain, hematuria, denies abdominal distention, denies abdominal pain, denies chills, denies fever, denies pelvic pain, denies vaginal discharge, denies urinary frequency, denies urinary hesitancy and denies urinary urgency.        Review of Systems   Constitutional:  Negative.    Eye:  Negative.    Ear/Nose/Mouth/Throat:  Negative.    Respiratory:  Negative.    Cardiovascular:  Negative.    Gastrointestinal:  Negative.    Genitourinary:  Dysuria.    Hematology/Lymphatics:  Negative.    Endocrine:  Negative.    Immunologic:  Negative.    Musculoskeletal:  Negative.    Integumentary:  Negative.    Neurologic:  Negative.    Psychiatric:  Negative.           All other systems reviewed and negative      Health Status   Allergies:    Allergic Reactions (Selected)  Moderate  Trospium (Swelling of hand)   Medications:  (Selected)   Prescriptions  Prescribed  Cipro 500 mg oral tablet: 1 tab(s) ( 500 mg ), po, q12 hrs, # 6 tab(s), 0 Refill(s), Type: Maintenance, Pharmacy: [facility],   tab(s) po q12 hrs,x3 day(s)  ipratropium CFC free 17 mcg/inh inhalation aerosol: 2 puff(s), inh, qid, # 1 EA, 5 Refill(s), Type: Maintenance, Pharmacy: [facility], 2 puff(s) inh qid  Documented Medications  Documented  Fish Oil: ( 1,000 mg ), PO, 0 Refill(s), Type: Soft Stop  Vitamin D3 1000 intl units oral capsule: 1 cap(s) ( 1,000 International Unit ), PO, daily, 0 Refill(s), Type: Soft Stop  gabapentin 100 mg oral tablet: 0 Refill(s), Type: Maintenance  lisinopril 20 mg oral tablet: 1 tab(s) ( 20 mg ), po, daily, 0 Refill(s)  omeprazole: PO, daily, 0 Refill(s), Type: Soft Stop  oxycodone 5 mg oral tablet: 1 tab(s) ( 5 mg ), PO, q4 hrs, 0 Refill(s), Type: Soft Stop  senna oral tablet: 2 tab(s), po, daily, 0 Refill(s), Type: Maintenance   Problem list:    All Problems  Allergic Rhinitis / ICD-9-.9 / Confirmed  Colon cancer / ICD-9-.9 / Confirmed  Hypertension / ICD-9-.9 / Confirmed  Nephrolithiasis / SNOMED CT 931634749 / Confirmed  Pancreatitis / ICD-9-.0 / Confirmed  VITREAL HEMORRHAGE RIGHT EYE / Confirmed      Histories   Past Medical History:    Active  Hypertension (401.9)  Allergic Rhinitis (477.9)  Colon cancer (153.9)   Family History:    Cancer  Mother (Lian, )  Heart disease  Father (Partha, )  Heart attack  Father (Partha, )     Procedure history:    No active procedure history items have been selected or recorded.   Social History:        Tobacco Assessment: Current            Current, Cigarettes, 10 per day.      Exercise and Physical Activity Assessment: Regular exercise        Physical Examination   Vital signs reviewed  and within acceptable limits    Vital Signs   2017 10:47 AM CDT Temperature Tympanic 98.4 DegF    Peripheral Pulse Rate 84 bpm    Systolic Blood Pressure 116 mmHg    Diastolic Blood Pressure 60 mmHg    Mean Arterial Pressure 79 mmHg      Measurements from flowsheet : Measurements   2017 10:47 AM CDT Height Measured -  Standard 66 in    Weight Measured - Standard 118.4 lb    BSA 1.58 m2    Body Mass Index 19.11 kg/m2      General:  No acute distress.    Respiratory:  Lungs are clear to auscultation, Respirations are non-labored.    Cardiovascular:  Normal rate, Regular rhythm, No murmur, No edema.    Gastrointestinal:  Soft, Non-tender, Non-distended, Normal bowel sounds, No organomegaly.    Genitourinary:  No costovertebral angle tenderness.    Musculoskeletal:  Normal gait.    Integumentary:  Warm, Dry, Pink.    Neurologic:  Alert, Oriented.    Psychiatric:  Cooperative, Appropriate mood & affect, Normal judgment.

## 2022-02-16 NOTE — PROGRESS NOTES
Patient:   ESTEBAN LEROY            MRN: [id number]            FIN: [id number]               Age:   78 years     Sex:  Female     :  1940   Associated Diagnoses:   Pressure ulcer of left foot, stage 2; Peripheral vascular disease; Bunion of great toe of left foot   Author:   Senthil Duran MD      Impression and Plan   Diagnosis     Pressure ulcer of left foot, stage 2 (BKN38-DB L89.892).     Peripheral vascular disease (KCA93-MQ I73.9).     Bunion of great toe of left foot (JHR69-FW M21.612).     Course:  improving slowly.    Plan:    1. Keep pressure off the area as much as possible  2. Dress the wound daily and keep it as clean as possible  3. Anitbiotic for ten days  4. Follow up if not healed in two weeks - in that case would refer for vascular studies of the LLE.    Orders     Orders   Charges (Evaluation and Management):  85684 office outpatient visit 15 minutes (Charge) (Order): Quantity: 1, Pressure ulcer of left foot, stage 2.     Orders (Selected)   Prescriptions  Prescribed  Augmentin 875 mg-125 mg oral tablet: = 1 tab(s), po, bidac, # 20 tab(s), 0 Refill(s), Type: Maintenance, Pharmacy: [facility], 1 tab(s) Oral bidac,x10 day(s).        Visit Information   Visit type:  New symptom.    Accompanied by:  No one.    Source of history:  Self.    History limitation:  None.       Chief Complaint   3/5/2019 1:22 PM CST     Pt here for a sore on her foot        History of Present Illness             The patient presents with patient developed a blister on the plantar aspect of the first MTP joint while wearing ill fitting boots.  She is concerned that two weeks have passed and she is not significantly healed yet..  Exacerbating factors consist of movement and walking.  Relieving factors consist of none.  Associated symptoms consist of none.  Additional pertinent history: none.        Review of Systems   Constitutional:  Negative.    Eye:  Negative.    Ear/Nose/Mouth/Throat:  Negative.     Respiratory:  Negative.    Cardiovascular:  Negative.    Gastrointestinal:  Negative.    Genitourinary:  Negative.    Hematology/Lymphatics:  Negative.    Endocrine:  Negative.    Immunologic:  Negative.    Musculoskeletal:  Negative.    Integumentary:  Negative except as documented in history of present illness.    Neurologic:  Negative.    Psychiatric:  Negative.    All other systems reviewed and negative      Health Status   Allergies:    Allergic Reactions (Selected)  Moderate  Trospium (Swelling of hand)   Medications:  (Selected)   Prescriptions  Prescribed  Augmentin 875 mg-125 mg oral tablet: = 1 tab(s), po, bidac, # 20 tab(s), 0 Refill(s), Type: Maintenance, Pharmacy: [facility], 1 tab(s) Oral bidac,x10 day(s)  ipratropium CFC free 17 mcg/inh inhalation aerosol: 2 puff(s), inh, qid, # 1 EA, 5 Refill(s), Type: Maintenance, Pharmacy: [facility], 2 puff(s) inh qid  Documented Medications  Documented  Fish Oil: ( 1,000 mg ), PO, 0 Refill(s), Type: Soft Stop  Vitamin D3 1000 intl units oral capsule: 1 cap(s) ( 1,000 International Unit ), PO, daily, 0 Refill(s), Type: Soft Stop  gabapentin 100 mg oral tablet: 0 Refill(s), Type: Maintenance  lisinopril 20 mg oral tablet: 1 tab(s) ( 20 mg ), po, daily, 0 Refill(s)  omeprazole: PO, daily, 0 Refill(s), Type: Soft Stop  oxycodone 5 mg oral tablet: 1 tab(s) ( 5 mg ), PO, q4 hrs, 0 Refill(s), Type: Soft Stop  senna oral tablet: 2 tab(s), po, daily, 0 Refill(s), Type: Maintenance   Problem list:    All Problems  Allergic Rhinitis / ICD-9-.9 / Confirmed  Colon cancer / ICD-9-.9 / Confirmed  Hypertension / ICD-9-.9 / Confirmed  Nephrolithiasis / SNOMED CT 025431567 / Confirmed  Pancreatitis / ICD-9-.0 / Confirmed  VITREAL HEMORRHAGE RIGHT EYE / Confirmed      Histories   Past Medical History:    Active  Hypertension (401.9)  Allergic Rhinitis (477.9)  Colon cancer (153.9)   Procedure history:    No active procedure history items  have been selected or recorded.   Social History:        Tobacco Assessment: Current            Current, Cigarettes, 10 per day.      Exercise and Physical Activity Assessment: Regular exercise      Physical Examination   Vital Signs   3/5/2019 1:22 PM CST Peripheral Pulse Rate 116 bpm  HI    Systolic Blood Pressure 102 mmHg    Diastolic Blood Pressure 68 mmHg    Mean Arterial Pressure 79 mmHg    BP Site Right arm    Oxygen Saturation 92 %  LOW      Measurements from flowsheet : Measurements   3/5/2019 1:22 PM CST Height Measured - Standard 66 in    Weight Measured - Standard 102 lb    BSA 1.47 m2    Body Mass Index 16.46 kg/m2  LOW      General:  Alert and oriented X 3, No acute distress, Warm, Pink, Intact.         Appearance: Within normal limits, Well nourished, Calm.         Hydration: Within normal limits.         Psych: Within normal limits, Appropriate mood and affect, Cooperative, Normal judgment.    Cardiovascular:  No edema, unable to palpate dorsalis pedis and posterior tibial pulses.    Integumentary:  5 mm diameter stage 2 ulcer on the plantar aspect of the first MTP joint.  No erythema, discharge or odor..

## 2022-02-16 NOTE — PROGRESS NOTES
Patient:   ESTEBAN LEROY            MRN: [id number]            FIN: [id number]               Age:   76 years     Sex:  Female     :  1940   Associated Diagnoses:   Left otitis externa; Ceruminosis   Author:   Pardeep PLASCENCIA, Bisi      Chief Complaint   1/3/2017 10:14 AM CST    C/O sinus congestion and drainage. Productive cough. Has had symptoms about 5 -6 days.        History of Present Illness             The patient presents with sinus problem, She was recently seen and treated for a drug reaction--causing rash on hands. She is on zyrtec and benadryl and has noted increased improvement but is back today as her URI symptoms have not resolved. States this has been ongoing for about 2 weeks and she feels it is an infection    She also has wax in both ears and would like them irrigated.  The sinus problem is located in the frontal sinus.  The sinus problem is characterized by nasal congestion and headache.  The severity of the sinus problem is moderate.  The sinus problem is worsening.  The context of the sinus problem: occurred in association with illness.  Exacerbating factors consist of.  Relieving factors consist of antihistamine.  Associated symptoms consist of cough, sore throat and denies fever.        Review of Systems            Health Status   Allergies:    Allergic Reactions (Selected)  Moderate  Trospium (Swelling of hand)   Medications:  (Selected)   Prescriptions  Prescribed  amoxicillin 500 mg oral capsule: 1 cap(s) ( 500 mg ), PO, TID, # 30 cap(s), 0 Refill(s), Type: Maintenance, Pharmacy: [facility], 1 cap(s) po tid,x10 day(s)  ipratropium CFC free 17 mcg/inh inhalation aerosol: 2 puff(s), inh, qid, # 1 EA, 5 Refill(s), Type: Maintenance, Pharmacy: [facility], 2 puff(s) inh qid  Documented Medications  Documented  Fish Oil: ( 1,000 mg ), PO, 0 Refill(s), Type: Soft Stop  Vitamin D3 1000 intl units oral capsule: 1 cap(s) ( 1,000 International Unit ), PO, daily, 0 Refill(s), Type:  Soft Stop  calcium carbonate: 0 Refill(s), Type: Soft Stop  gabapentin 100 mg oral tablet: 0 Refill(s), Type: Maintenance  lisinopril 20 mg oral tablet: 1 tab(s) ( 20 mg ), po, daily, 0 Refill(s)  omeprazole: PO, daily, 0 Refill(s), Type: Soft Stop  oxycodone 5 mg oral tablet: 1 tab(s) ( 5 mg ), PO, q4 hrs, 0 Refill(s), Type: Soft Stop  senna oral tablet: 2 tab(s), po, daily, 0 Refill(s), Type: Maintenance   Problem list:    All Problems  VITREAL HEMORRHAGE RIGHT EYE / Confirmed  Colon cancer / ICD-9-.9 / Confirmed  Hypertension / ICD-9-.9 / Confirmed  Allergic Rhinitis / ICD-9-.9 / Confirmed  Pancreatitis / ICD-9-.0 / Confirmed      Histories   Past Medical History:    Active  Hypertension (401.9)  Allergic Rhinitis (477.9)  Colon cancer (153.9)   Family History:    Cancer  Mother (Lian, )  Heart disease  Father (Partha, )  Heart attack  Father (Partha, )     Procedure history:    No active procedure history items have been selected or recorded.   Social History:        Tobacco Assessment: Current            Current, Cigarettes, 10 per day.      Exercise and Physical Activity Assessment: Regular exercise        Physical Examination   Vital Signs   1/3/2017 10:14 AM CST Temperature Tympanic 97.7 DegF  LOW    Peripheral Pulse Rate 72 bpm    Systolic Blood Pressure 180 mmHg  HI    Diastolic Blood Pressure 84 mmHg    Mean Arterial Pressure 116 mmHg    BP Systolic Repeat 150 mmHg    BP Diastolic Repeat 82 mmHg    Oxygen Saturation 94 %      Measurements from flowsheet : Measurements   1/3/2017 10:14 AM CST Height Measured - Standard 66 in    Weight Measured - Standard 119.0 lb    BSA 1.58 m2    Body Mass Index 19.21 kg/m2      General:  Alert and oriented, No acute distress.    Eye:  Normal conjunctiva.    HENT:  Normal hearing, Oral mucosa is moist, No pharyngeal erythema, both TM's occluded with hard wax plugs.    Neck:  Non-tender, No lymphadenopathy.    Respiratory:   Lungs are clear to auscultation, Respirations are non-labored, Breath sounds are equal.    Cardiovascular:  Normal rate, Regular rhythm, No murmur.    Integumentary:  Warm, Dry, Pink, No rash.    Neurologic:  Alert, Oriented.    Psychiatric:  Cooperative, Appropriate mood & affect.       Review / Management   Course:  ear wax softening drops instilled both ears and allowed to sit for 20 min. Then both ears irrigated with warm water, tolerated well. I did need to use currete in the the left ear to removed large plug. Tm s then visualized bilat snd clear but left ear canal red and irritated.       Impression and Plan   Diagnosis     Acute sinusitis (HSI86-KS J01.90).     Ceruminosis (OMW10-CW H61.23).     Left otitis externa (TQR63-OP H60.92).     Patient Instructions:       Counseled: Patient, Verbalized understanding.    Orders     Orders (Selected)   Prescriptions  Prescribed  Cortisporin Otic suspension: 3 drop(s), left ear, TID, Instructions: pt neo call before picking up, # 10 mL, 0 Refill(s), Type: Maintenance, Pharmacy: [facility], 3 drop(s) left ear tid,x7 day(s),Instr:pt neo call before picking up  amoxicillin 500 mg oral capsule: 1 cap(s) ( 500 mg ), PO, TID, # 30 cap(s), 0 Refill(s), Type: Maintenance, Pharmacy: [facility], 1 cap(s) po tid,x10 day(s).     Use a mucolytic (like guaifenesin/Mucinex/Robitussin) and saline nasal spray, a decongestant (pseudoephedrine--kept behind the counter--if not contraindicated), return if not better.  If you were prescribed an antibiotic,  a probiotic at the pharmacy to help prevent diarrhea and yeast infections, antibiotics can cause this.      .

## 2022-02-16 NOTE — NURSING NOTE
Comprehensive Intake Entered On:  7/28/2021 1:17 PM CDT    Performed On:  7/28/2021 1:03 PM CDT by Orquidea Landers CMA               Summary   Chief Complaint :   Consult per [facility]DL for incontinence. Hx of anal cancer with chemo/radiation treatment over 10 years ago. Levaquin rx'd 7/20 x 7 days.   Weight Measured :   94.3 lb(Converted to: 94 lb 5 oz, 42.774 kg)    Systolic Blood Pressure :   114 mmHg   Diastolic Blood Pressure :   54 mmHg (LOW)    Mean Arterial Pressure :   74 mmHg   Peripheral Pulse Rate :   76 bpm   BP Site :   Right arm   Pulse Site :   Radial artery   BP Method :   Manual   HR Method :   Manual   Orquidea Landers CMA - 7/28/2021 1:03 PM CDT   Health Status   Allergies Verified? :   Yes   Medication History Verified? :   Yes   Pre-Visit Planning Status :   Completed   Orquidea Landers CMA - 7/28/2021 1:03 PM CDT   Meds / Allergies   (As Of: 7/28/2021 1:17:37 PM CDT)   Allergies (Active)   trospium  Estimated Onset Date:   Unspecified ; Reactions:   Swelling of hand ; Created By:   Kadi Ko RN; Reaction Status:   Active ; Category:   Drug ; Substance:   trospium ; Type:   Allergy ; Severity:   Moderate ; Updated By:   Kadi Ko RN; Reviewed Date:   7/16/2021 2:00 PM CDT        Medication List   (As Of: 7/28/2021 1:17:38 PM CDT)   Home Meds    amLODIPine  :   amLODIPine ; Status:   Documented ; Ordered As Mnemonic:   amLODIPine 5 mg oral tablet ; Simple Display Line:   5 mg, 1 tab(s), Oral, daily, 90 tab(s), 0 Refill(s) ; Catalog Code:   amLODIPine ; Order Dt/Tm:   7/28/2021 1:09:58 PM CDT          cholecalciferol  :   cholecalciferol ; Status:   Documented ; Ordered As Mnemonic:   Vitamin D3 1000 intl units oral capsule ; Simple Display Line:   1,000 International Unit, 1 cap(s), PO, daily ; Catalog Code:   cholecalciferol ; Order Dt/Tm:   7/24/2012 2:30:03 PM CDT          cyclobenzaprine  :   cyclobenzaprine ; Status:   Documented ; Ordered As Mnemonic:   cyclobenzaprine ; Simple  Display Line:   5 mg, Oral, hs, 0 Refill(s) ; Catalog Code:   cyclobenzaprine ; Order Dt/Tm:   12/19/2019 2:44:36 PM CST          gabapentin  :   gabapentin ; Status:   Documented ; Ordered As Mnemonic:   gabapentin 300 mg oral capsule ; Simple Display Line:   600 mg, 2 cap(s), Oral, tid, 0 Refill(s) ; Catalog Code:   gabapentin ; Order Dt/Tm:   7/28/2021 1:11:34 PM CDT          ketoconazole topical  :   ketoconazole topical ; Status:   Documented ; Ordered As Mnemonic:   ketoconazole 2% topical cream ; Simple Display Line:   1 ignacio, Topical, daily, 15 gm, 0 Refill(s) ; Catalog Code:   ketoconazole topical ; Order Dt/Tm:   7/28/2021 1:12:11 PM CDT          lisinopril  :   lisinopril ; Status:   Documented ; Ordered As Mnemonic:   lisinopril 10 mg oral tablet ; Simple Display Line:   10 mg, 1 tab(s), Oral, daily, 90 tab(s), 0 Refill(s) ; Catalog Code:   lisinopril ; Order Dt/Tm:   7/28/2021 1:12:23 PM CDT          omega-3 polyunsaturated fatty acids  :   omega-3 polyunsaturated fatty acids ; Status:   Documented ; Ordered As Mnemonic:   Fish Oil ; Simple Display Line:   1,000 mg, PO ; Catalog Code:   omega-3 polyunsaturated fatty acids ; Order Dt/Tm:   7/24/2012 2:30:31 PM CDT          omeprazole  :   omeprazole ; Status:   Documented ; Ordered As Mnemonic:   omeprazole 20 mg oral delayed release capsule ; Simple Display Line:   20 mg, 1 cap(s), Oral, bid, 90 cap(s), 0 Refill(s) ; Catalog Code:   omeprazole ; Order Dt/Tm:   7/28/2021 1:13:25 PM CDT          oxyCODONE  :   oxyCODONE ; Status:   Documented ; Ordered As Mnemonic:   oxyCODONE 5 mg oral capsule ; Simple Display Line:   5 mg, 1 cap(s), Oral, q6 hrs, PRN: for pain, 0 Refill(s) ; Catalog Code:   oxyCODONE ; Order Dt/Tm:   1/9/2020 9:56:36 AM CST          senna  :   senna ; Status:   Documented ; Ordered As Mnemonic:   senna oral tablet ; Simple Display Line:   2 tab(s), po, daily ; Catalog Code:   senna ; Order Dt/Tm:   1/7/2013 11:33:17 AM CST           zoledronic acid  :   zoledronic acid ; Status:   Documented ; Ordered As Mnemonic:   Reclast 5 mg/100 mL intravenous solution ; Simple Display Line:   See Instructions, 5 mg IV annually. Last dose 10/28/2020, 0 Refill(s) ; Catalog Code:   zoledronic acid ; Order Dt/Tm:   6/14/2021 12:55:07 PM CDT

## 2022-02-16 NOTE — TELEPHONE ENCOUNTER
---------------------  From: Carie Escalante LPN (Phone Messages Pool ([id number]_[facility]))   Sent: 8/25/2021 9:46:29 AM CDT  Subject: appt     Phone Message    PCP:   FREDIS      Time of Call:  9:40am       Person Calling:  pt  Phone number:  _    Returned call at: _    Note:   Pt calling stating she received result letters from FREDIS stating she has a fracture. Pt wondering if she should see Dr. Caicedo. Told pt per letter FREDIS wanted her to schedule appt with him.    Pt says she is busy with moving and getting her house ready. She will have to call back to schedule.    Last office visit and reason:  8/9/21

## 2022-02-16 NOTE — PROGRESS NOTES
Patient:   ESTEBAN LEROY            MRN: [id number]            FIN: [id number]               Age:   81 years     Sex:  Female     :  1940   Associated Diagnoses:   Overactive bladder; Urinary tract infection   Author:   Bird Marshall MD      Visit Information      Date of Service: 2021 12:59 pm  Performing Location: [facility]  Encounter#: [id number]      Primary Care Provider (PCP):  Hugh Torres MD    NPI# [id number]      Referring Provider:  Bird Marshall MD    NPI# [id number]      Chief Complaint   2021 1:03 PM CDT    Consult per [facility]DL for incontinence. Hx of anal cancer with chemo/radiation treatment over 10 years ago. Levaquin rx'd 7/20 x 7 days.      Interval History   The patient is referred by Dr. Torres for evaluation of recurring urinary tract infection and urgency incontinence.  The patient is an 81-year-old female who had renal cancer treated 14 years ago with radiation and chemo.  She probably had some issues at that time with some urinary urgency but more recently has had issues with urgency incontinence.  She feels that her bladder has a fairly low capacity.  She has had several urinary tract infections in the past and recently had an E. coli infection treated and then shortly afterwards had another culture verified bladder infection and has just completed Levaquin therapy as of last night.  She was having some organish spotting when she had her incontinence before she had her recent infection treated but did not really notice blood.  She did not miss any medications.  She continues to have some urinary urgency and often does not get to the bathroom.  She wears protection all the time because of this.  She has a history of kidney infection and her doctor treating her for that gave her Flomax and she had angioedema as a reaction around four years ago.  She tried a second medication and had a similar reaction and therefore was told that she could not use such  medications.  She has several other medical issues one of which is some pain in her upper abdomen on the right side which has troubled her quite a bit recently.  It is not associated with any other nausea or diarrhea.  She did have some black stools that she brought in for evaluation and they were negative for blood.  She also states that she has had some ankle swelling which she feels is new to her.        Review of Systems   Review  of systems is negative except as documented under interval history.      Health Status   Allergies:    Allergic Reactions (Selected)  Moderate  Trospium (Swelling of hand)   Medications:  (Selected)   Documented Medications  Documented  Fish Oil: ( 1,000 mg ), PO, 0 Refill(s), Type: Soft Stop  Reclast 5 mg/100 mL intravenous solution: See Instructions, Instructions: 5 mg IV annually. Last dose 10/28/2020, 0 Refill(s), Type: Maintenance  Vitamin D3 1000 intl units oral capsule: 1 cap(s) ( 1,000 International Unit ), PO, daily, 0 Refill(s), Type: Soft Stop  amLODIPine 5 mg oral tablet: = 1 tab(s) ( 5 mg ), Oral, daily, # 90 tab(s), 0 Refill(s), Type: Maintenance  cyclobenzaprine: ( 5 mg ), Oral, hs, 0 Refill(s), Type: Maintenance  gabapentin 300 mg oral capsule: = 2 cap(s) ( 600 mg ), Oral, tid, 0 Refill(s), Type: Maintenance  ketoconazole 2% topical cream: 1 ignacio, Topical, daily, # 15 gm, 0 Refill(s), Type: Maintenance  lisinopril 10 mg oral tablet: = 1 tab(s) ( 10 mg ), Oral, daily, # 90 tab(s), 0 Refill(s), Type: Maintenance  omeprazole 20 mg oral delayed release capsule: = 1 cap(s) ( 20 mg ), Oral, bid, # 90 cap(s), 0 Refill(s), Type: Maintenance  oxyCODONE 5 mg oral capsule: = 1 cap(s) ( 5 mg ), Oral, q6 hrs, PRN: for pain, 0 Refill(s), Type: Maintenance  senna oral tablet: 2 tab(s), po, daily, 0 Refill(s), Type: Maintenance   Problem list:    All Problems  Hypertension / ICD-9-.9 / Confirmed  Allergic Rhinitis / ICD-9-.9 / Confirmed  Pancreatitis / ICD-9-.0 /  Confirmed  Colon cancer / ICD-9-.9 / Confirmed  VITREAL HEMORRHAGE RIGHT EYE / Confirmed  Nephrolithiasis / SNOMED CT 520005646 / Confirmed  Pressure ulcer of left foot, stage 2 / SNOMED CT 9271872383 / Confirmed  Bunion of great toe of left foot / SNOMED CT 9387145475 / Confirmed  Peripheral vascular disease / SNOMED CT 8212472671 / Confirmed  Tobacco user / SNOMED CT 230623738 / Probable  Osteoporosis / SNOMED CT 269844016 / Confirmed  Anal cancer / SNOMED CT 870476673 / Confirmed  HTN, goal below 130/80 / SNOMED CT 9871353293 / Confirmed  Bladder incontinence / SNOMED CT 1216086217 / Confirmed      Histories   Past Medical History:    Active  Hypertension (401.9)  Allergic Rhinitis (477.9)  Colon cancer (153.9)   Family History:    Cancer  Mother (Lian, )  Heart disease  Father (Partha, )  Heart attack  Father (Partha, )     Procedure history:    Colonoscopy (307653520).   Social History:        Electronic Cigarette/Vaping Assessment            Electronic Cigarette Use: Never.      Tobacco Assessment: Current            Smoker, current status unknown            Current, Cigarettes, 10 per day.      Exercise and Physical Activity Assessment: Regular exercise        Physical Examination   Vital Signs   2021 1:03 PM CDT Peripheral Pulse Rate 76 bpm    Pulse Site Radial artery    HR Method Manual    Systolic Blood Pressure 114 mmHg    Diastolic Blood Pressure 54 mmHg  LOW    Mean Arterial Pressure 74 mmHg    BP Site Right arm    BP Method Manual      General:  Ankles do not seem swollen.  .    Gastrointestinal:  There is no mass over her abdomen where she has perceived her pain.  .       Impression and Plan   Diagnosis     Overactive bladder (RRN02-OU N32.81).     Urinary tract infection (EQP48-OW N39.0).     Recurring urinary tract infection with bladder dysfunction complicated by previous pelvic radiation.  .     Plan:  The patient was advised that her issue with incontinence is  probably best treated with anticholinergic medications and since she has had severe reaction, she is probably not a candidate for any further medications.  Since she is having recurring urinary tract infection, we may be able to prevent urinary tract infection and help improve her bladder healing with longer term antibiotics.  We will get a culture in a couple of days once the Levaquin is out of her bladder and see if there is any residual infection and treat with probably nitrofurantoin as a prophylactic agent for a couple of months to see if she feels better on that medication.  She was asked to see Dr. Torres regarding her abdominal pain and have further evaluation..    Patient Instructions:       Counseled: Patient, Regarding diagnosis, Regarding treatment, Regarding medications, Verbalized understanding, Total time with the patient today is 20 minutes all in face-to-face counseling.  .

## 2022-02-16 NOTE — NURSING NOTE
Comprehensive Intake Entered On:  6/14/2021 12:07 PM CDT    Performed On:  6/14/2021 12:02 PM CDT by Diane Mason               Summary   Chief Complaint :   1.c/o urinary frequency 2. black stools    Weight Measured :   95.6 lb(Converted to: 95 lb 10 oz, 43.363 kg)    Systolic Blood Pressure :   129 mmHg   Diastolic Blood Pressure :   73 mmHg   Mean Arterial Pressure :   92 mmHg   Peripheral Pulse Rate :   99 bpm   BP Site :   Right arm   BP Method :   Electronic   HR Method :   Electronic   Diane Mason - 6/14/2021 12:02 PM CDT   Health Status   Allergies Verified? :   Yes   Medication History Verified? :   Yes   Medical History Verified? :   Yes   Pre-Visit Planning Status :   Completed   Tobacco Use? :   Current every day smoker   Diane Mason - 6/14/2021 12:02 PM CDT   Consents   Consent for Immunization Exchange :   Consent Granted   Consent for Immunizations to Providers :   Consent Granted   Diane Mason - 6/14/2021 12:02 PM CDT   Meds / Allergies   (As Of: 6/14/2021 12:07:43 PM CDT)   Allergies (Active)   trospium  Estimated Onset Date:   Unspecified ; Reactions:   Swelling of hand ; Created By:   Kadi Ko RN; Reaction Status:   Active ; Category:   Drug ; Substance:   trospium ; Type:   Allergy ; Severity:   Moderate ; Updated By:   Kadi Ko RN; Reviewed Date:   6/14/2021 12:05 PM CDT        Medication List   (As Of: 6/14/2021 12:07:43 PM CDT)   Prescription/Discharge Order    ipratropium  :   ipratropium ; Status:   Prescribed ; Ordered As Mnemonic:   ipratropium CFC free 17 mcg/inh inhalation aerosol ; Simple Display Line:   2 puff(s), inh, qid, 1 EA, 5 Refill(s) ; Ordering Provider:   Senthil Duran MD; Catalog Code:   ipratropium ; Order Dt/Tm:   12/29/2016 4:19:08 PM CST            Home Meds    cholecalciferol  :   cholecalciferol ; Status:   Documented ; Ordered As Mnemonic:   Vitamin D3 1000 intl units oral capsule ; Simple Display Line:   1,000  International Unit, 1 cap(s), PO, daily ; Catalog Code:   cholecalciferol ; Order Dt/Tm:   7/24/2012 2:30:03 PM CDT          cyclobenzaprine  :   cyclobenzaprine ; Status:   Documented ; Ordered As Mnemonic:   cyclobenzaprine ; Simple Display Line:   Oral, 0 Refill(s) ; Catalog Code:   cyclobenzaprine ; Order Dt/Tm:   12/19/2019 2:44:36 PM CST          docusate  :   docusate ; Status:   Documented ; Ordered As Mnemonic:   Dulcolax Stool Softener ; Simple Display Line:   100 mg, Oral, bid, 0 Refill(s) ; Catalog Code:   docusate ; Order Dt/Tm:   6/14/2021 12:06:16 PM CDT          gabapentin  :   gabapentin ; Status:   Documented ; Ordered As Mnemonic:   gabapentin 100 mg oral tablet ; Catalog Code:   gabapentin ; Order Dt/Tm:   1/7/2013 11:32:55 AM CST          lisinopril  :   lisinopril ; Status:   Documented ; Ordered As Mnemonic:   lisinopril 20 mg oral tablet ; Simple Display Line:   20 mg, 1 tab(s), po, daily ; Catalog Code:   lisinopril ; Order Dt/Tm:   9/15/2010 11:31:32 AM CDT          omega-3 polyunsaturated fatty acids  :   omega-3 polyunsaturated fatty acids ; Status:   Documented ; Ordered As Mnemonic:   Fish Oil ; Simple Display Line:   1,000 mg, PO ; Catalog Code:   omega-3 polyunsaturated fatty acids ; Order Dt/Tm:   7/24/2012 2:30:31 PM CDT          omeprazole  :   omeprazole ; Status:   Documented ; Ordered As Mnemonic:   omeprazole ; Simple Display Line:   PO, daily ; Catalog Code:   omeprazole ; Order Dt/Tm:   7/24/2012 2:27:58 PM CDT          oxyCODONE  :   oxyCODONE ; Status:   Documented ; Ordered As Mnemonic:   oxyCODONE 5 mg oral capsule ; Simple Display Line:   5 mg, 1 cap(s), Oral, q6 hrs, PRN: for pain, 0 Refill(s) ; Catalog Code:   oxyCODONE ; Order Dt/Tm:   1/9/2020 9:56:36 AM CST          senna  :   senna ; Status:   Documented ; Ordered As Mnemonic:   senna oral tablet ; Simple Display Line:   2 tab(s), po, daily ; Catalog Code:   senna ; Order Dt/Tm:   1/7/2013 11:33:17 AM CST             ID Risk Screen   Recent Travel History :   No recent travel   Family Member Travel History :   No recent travel   Other Exposure to Infectious Disease :   Unknown   COVID-19 Testing Status :   No COVID-19 test performed   Diane Mason - 6/14/2021 12:02 PM CDT   Social History   Social History   (As Of: 6/14/2021 12:07:43 PM CDT)   Tobacco:  Current      Current, Cigarettes, 10 per day.   (Last Updated: 9/15/2010 11:32:01 AM CDT by Monet Wilder CMA)   Smoker, current status unknown   (Last Updated: 6/14/2021 12:03:14 PM CDT by Diane Mason)          Electronic Cigarette/Vaping:        Electronic Cigarette Use: Never.   (Last Updated: 6/14/2021 12:03:18 PM CDT by Diane Mason)          Exercise:  Regular exercise      (Last Updated: 9/15/2010 11:32:07 AM CDT by Monet Wilder CMA )

## 2022-02-16 NOTE — TELEPHONE ENCOUNTER
---------------------  From: Shalini Pineda CMA   To: Senthil Duran MD;     Sent: 2/3/2020 1:39:37 PM CST  Subject: General Message     Phone Message    Note:   Pt called to say that she still has swelling in her mouth and can not have any surgery done until the swelling is gone... she is wondering if you want to refill her abx or have her come back in ?          Person Calling:   Concepción  Phone number:  [phone]        PCP:   _    Time of Call:  _    Last office visit and reason:  _    Transferred to: _---------------------  From: Senthil Duran MD   To: Shalini Pineda CMA;     Sent: 2/3/2020 2:20:05 PM CST  Subject: RE: General Message     rx sentpt notified

## 2022-02-16 NOTE — TELEPHONE ENCOUNTER
---------------------  From: Kalyn Alex CMA   To: [employer] Message Pool ([id number] [facility] [place]);     Sent: 7/13/2021 4:56:42 PM CDT  Subject: Hematuria/Back pain     PCP:   FREDIS      Time of Call:  1635       Person Calling:  Patient  Phone number:  [phone] vm ok    Returned call at: _    Note:   Patient c/o hematuria and low back pain x2 days. Saw JKEYANNA 6/14/21 for similar urinary concerns. ABX regimen did clear symptoms, although returning now. Advise.   Patient would only have a ride on Thursday if needing appt.    Last office visit and reason:  6/14/21 Multiple w/ JDL---------------------  From: Nesha/Diane GREY ([employer] Message Pool ([id number] [facility] [place]))   To: Hugh Torres MD;     Sent: 7/13/2021 5:01:29 PM CDT  Subject: FW: Hematuria/Back pain---------------------  From: Hugh Torres MD   To: [employer] Pool ([id number] [facility] [place]);     Sent: 7/13/2021 5:37:17 PM CDT  Subject: RE: Hematuria/Back pain     UA UCPatient notified and was transferred to scheduling.RTC placed

## 2022-02-16 NOTE — TELEPHONE ENCOUNTER
---------------------  From: Kia Elise CMA (Phone Messages Pool ([id number]))   To: [employer] Message Pool ([id number]);     Sent: 6/24/2021 11:57:20 AM CDT  Subject: phone note- black stools     Phone Message    PCP:    FREDIS      Time of Call:  11:51am       Person Calling:  Radha  Phone number:  [phone]    Note:  Patient called looking for the results of her FIT test. Notified patient of negative result. Patient is wondering why her stool is black and brown in color. Patient asks what is the reason for it? Please advise.---------------------  From: Diane Mason ([employer] Message Pool ([id number]))   To: Hugh Torres MD;     Sent: 6/24/2021 12:46:50 PM CDT  Subject: FW: phone note- black stools---------------------  From: Hugh Torres MD   To: [employer] Message Pool ([id number]);     Sent: 6/24/2021 1:26:27 PM CDT  Subject: RE: phone note- black stools     I do not know, but it does not appear to be blood. Pigments in foods?Patient notified and had no further questions.

## 2022-02-16 NOTE — PROGRESS NOTES
Chief Complaint    1.c/o urinary frequency 2. black stools  History of Present Illness       Radha is an 81-year-old who complains of 1 month of urinary frequency and urgency.  No dysuria.  No abdominal, back, or flank pain.  No fever, chills, incontinence.       She also complains of dark stools.  Episodically for the past year part of a stool might be black but mostly brown.  No abdominal pain.  No rectal bleeding.  No nausea or vomiting.  Had a history of an ulcer many years ago.  She tells me she was diagnosed with anal cancer when she was 69 and has had numerous colonoscopies most recently last year with 6 polyps.       She [facility] of [facility]  She has annual Reclast for osteoporosis.  Review of Systems       No fever, chills, headache, myalgia, cough, dyspnea, PND, orthopnea, edema, angina.  Physical Exam   Vitals & Measurements    HR: 99 (Peripheral)  BP: 129/73     WT: 95.6 lb        Patient is a slight woman in no distress.  Alert and oriented.  Sclera anicteric.  HEENT exam unremarkable.  Neck is supple no nodes or thyromegaly.  Normal carotid pulsations.  Chest is clear.  Cardiac exam is regular no murmur.  No edema.  Abdomen soft and nontender.  No CVA tenderness.  Assessment/Plan       Anal cancer (C21.0)          Apparently treated with radiation chemotherapy per patient with appropriate surveillance.         Ordered:          71893 office o/p est mod 30-39 min (Charge), Quantity: 1, Urinary frequency  Osteoporosis  HTN, goal below 130/80  Dark stools  Anal cancer                Dark stools (R19.5)          Does not really suggest a GI bleed given the prolonged course and intermittent nature.  Will get a CBC FITtest.         Ordered:          [id number] office o/p est mod 30-39 min (Charge), Quantity: 1, Urinary frequency  Osteoporosis  HTN, goal below 130/80  Dark stools  Anal cancer          CBC (includes diff/plt)* ([facility]), Specimen Type: Blood, Collection Date:  06/14/21 12:20:00 CDT                HTN, goal below 130/80 (I10)          Controlled.  BMP.         Ordered:          98922 office o/p est mod 30-39 min (Charge), Quantity: 1, Urinary frequency  Osteoporosis  HTN, goal below 130/80  Dark stools  Anal cancer          Basic Metabolic Panel* ([facility]), Specimen Type: Serum, Collection Date: 06/14/21 12:20:00 CDT                Osteoporosis (M81.0)          On request.         Ordered:          [id number] office o/p est mod 30-39 min (Charge), Quantity: 1, Urinary frequency  Osteoporosis  HTN, goal below 130/80  Dark stools  Anal cancer                Urinary frequency (R35.0)          Patient is for urinary tract infection.  UA UC.         Ordered:           office o/p est mod 30-39 min (Charge), Quantity: 1, Urinary frequency  Osteoporosis  HTN, goal below 130/80  Dark stools  Anal cancer          Culture, Urine, Routine* ([facility]), Specimen Type: Urine (Clean Catch), Collection Date: 06/14/21 12:20:00 CDT          Urinalysis (Request), Priority: STAT, Urinary frequency          Urine Microscopic (Request), Priority: STAT, Urinary frequency           Patient Information     Name:ESTEBAN LEROY      Address:      [address] [id number]     Sex:Female     YOB: 1940     [phone]     MRN:[id number]     FIN:[id number]     Location:[facility]     Date of Service:06/14/2021      Primary Care Physician:       Senthil Duran MD, [phone]      Attending Physician:       Hugh Torres MD, [phone]  Problem List/Past Medical History    Ongoing     Allergic Rhinitis     Anal cancer     Bunion of great toe of left foot     Colon cancer     HTN, goal below 130/80     Hypertension     Nephrolithiasis     Osteoporosis     Pancreatitis     Peripheral vascular disease     Pressure ulcer of left foot, stage 2     Tobacco user     VITREAL HEMORRHAGE RIGHT EYE    Historical     No qualifying  data  Procedure/Surgical History     Colonoscopy  Medications    cyclobenzaprine, Oral    Dulcolax Stool Softener, 100 mg, Oral, bid    Fish Oil, 1000 mg, Oral    gabapentin 100 mg oral tablet    ipratropium CFC free 17 mcg/inh inhalation aerosol, 2 puff(s), Inhale, qid, 5 refills    lisinopril 20 mg oral tablet, 20 mg= 1 tab(s), Oral, daily    omeprazole, Oral, daily    oxyCODONE 5 mg oral capsule, 5 mg= 1 cap(s), Oral, q6 hrs, PRN    Reclast 5 mg/100 mL intravenous solution, See Instructions    senna oral tablet, 2 tab(s), Oral, daily    Vitamin D3 1000 intl units oral capsule, 1000 International Unit= 1 cap(s), Oral, daily  Allergies    trospium (Swelling of hand)  Social History    Smoking Status     Current every day smoker     Electronic Cigarette/Vaping      Electronic Cigarette Use: Never.     Exercise - Regular exercise     Tobacco - Current      Smoker, current status unknown  Family History    Cancer: Mother.    Heart attack: Father.    Heart disease: Father.  Immunizations          Scheduled Immunizations          Dose Date(s)          influenza virus vaccine, inactivated          11/10/2005, 10/26/2006, 09/14/2009, 09/29/2010, 12/06/2012, 09/22/2014, 12/02/2015, 12/20/2016, 09/25/2017, 09/26/2018          pneumococcal (PCV13)          12/20/2016          pneumococcal (PPSV23)          01/25/2012          Td          01/25/2012, 01/01/2011          Other Immunizations          influenza virus vaccine, inactivated          12/30/2011

## 2022-02-16 NOTE — TELEPHONE ENCOUNTER
ESTEBAN LEROY : 1940  2020 MRN: [id number]  PHONE NOTE: The patient called to report that her right cheek is swollen and it feels like her sinuses are congested.  She did have a respiratory infection a week ago and it sounds like it has gone into her right maxillary sinus.    P: I authorized a prescription to [facility] for Augmentin 875 1 b.i.d. for 10 days.  I told her if this did not improve in a matter of two or three days then she should come to the clinic for examination.     D:  2020  T:  2020 Senthil Duran MD/sq

## 2022-02-16 NOTE — NURSING NOTE
Comprehensive Intake Entered On:  7/16/2021 2:01 PM CDT    Performed On:  7/16/2021 1:57 PM CDT by Diane Mason               Summary   Chief Complaint :   Verbal consent given for telephone visit. Pt is wanting to know why she keeps getting UTI's. Pt c/o edema in feet and ankle.    Diane Mason - 7/16/2021 1:57 PM CDT   Health Status   Allergies Verified? :   Yes   Medication History Verified? :   Yes   Medical History Verified? :   Yes   Pre-Visit Planning Status :   Completed   Tobacco Use? :   Current every day smoker   Diane Mason - 7/16/2021 1:57 PM CDT   Consents   Consent for Immunization Exchange :   Consent Granted   Consent for Immunizations to Providers :   Consent Granted   Diane Mason - 7/16/2021 1:57 PM CDT   Meds / Allergies   (As Of: 7/16/2021 2:01:26 PM CDT)   Allergies (Active)   trospium  Estimated Onset Date:   Unspecified ; Reactions:   Swelling of hand ; Created By:   Kadi Ko RN; Reaction Status:   Active ; Category:   Drug ; Substance:   trospium ; Type:   Allergy ; Severity:   Moderate ; Updated By:   Kadi Ko RN; Reviewed Date:   7/16/2021 2:00 PM CDT        Medication List   (As Of: 7/16/2021 2:01:26 PM CDT)   Prescription/Discharge Order    ipratropium  :   ipratropium ; Status:   Prescribed ; Ordered As Mnemonic:   ipratropium CFC free 17 mcg/inh inhalation aerosol ; Simple Display Line:   2 puff(s), inh, qid, 1 EA, 5 Refill(s) ; Ordering Provider:   Senthil Duran MD; Catalog Code:   ipratropium ; Order Dt/Tm:   12/29/2016 4:19:08 PM CST            Home Meds    zoledronic acid  :   zoledronic acid ; Status:   Documented ; Ordered As Mnemonic:   Reclast 5 mg/100 mL intravenous solution ; Simple Display Line:   See Instructions, 5 mg IV annually. Last dose 10/28/2020, 0 Refill(s) ; Catalog Code:   zoledronic acid ; Order Dt/Tm:   6/14/2021 12:55:07 PM CDT          senna  :   senna ; Status:   Documented ; Ordered As Mnemonic:   senna oral  tablet ; Simple Display Line:   2 tab(s), po, daily ; Catalog Code:   senna ; Order Dt/Tm:   1/7/2013 11:33:17 AM CST          oxyCODONE  :   oxyCODONE ; Status:   Documented ; Ordered As Mnemonic:   oxyCODONE 5 mg oral capsule ; Simple Display Line:   5 mg, 1 cap(s), Oral, q6 hrs, PRN: for pain, 0 Refill(s) ; Catalog Code:   oxyCODONE ; Order Dt/Tm:   1/9/2020 9:56:36 AM CST          omeprazole  :   omeprazole ; Status:   Documented ; Ordered As Mnemonic:   omeprazole ; Simple Display Line:   PO, daily ; Catalog Code:   omeprazole ; Order Dt/Tm:   7/24/2012 2:27:58 PM CDT          omega-3 polyunsaturated fatty acids  :   omega-3 polyunsaturated fatty acids ; Status:   Documented ; Ordered As Mnemonic:   Fish Oil ; Simple Display Line:   1,000 mg, PO ; Catalog Code:   omega-3 polyunsaturated fatty acids ; Order Dt/Tm:   7/24/2012 2:30:31 PM CDT          lisinopril  :   lisinopril ; Status:   Documented ; Ordered As Mnemonic:   lisinopril 20 mg oral tablet ; Simple Display Line:   20 mg, 1 tab(s), po, daily ; Catalog Code:   lisinopril ; Order Dt/Tm:   9/15/2010 11:31:32 AM CDT          gabapentin  :   gabapentin ; Status:   Documented ; Ordered As Mnemonic:   gabapentin 100 mg oral tablet ; Catalog Code:   gabapentin ; Order Dt/Tm:   1/7/2013 11:32:55 AM CST          docusate  :   docusate ; Status:   Documented ; Ordered As Mnemonic:   Dulcolax Stool Softener ; Simple Display Line:   100 mg, Oral, bid, 0 Refill(s) ; Catalog Code:   docusate ; Order Dt/Tm:   6/14/2021 12:06:16 PM CDT          cyclobenzaprine  :   cyclobenzaprine ; Status:   Documented ; Ordered As Mnemonic:   cyclobenzaprine ; Simple Display Line:   Oral, 0 Refill(s) ; Catalog Code:   cyclobenzaprine ; Order Dt/Tm:   12/19/2019 2:44:36 PM CST          cholecalciferol  :   cholecalciferol ; Status:   Documented ; Ordered As Mnemonic:   Vitamin D3 1000 intl units oral capsule ; Simple Display Line:   1,000 International Unit, 1 cap(s), PO, daily ;  Catalog Code:   cholecalciferol ; Order Dt/Tm:   7/24/2012 2:30:03 PM CDT

## 2022-02-16 NOTE — TELEPHONE ENCOUNTER
---------------------  From: Kadi Ko RN   To: Kashif De Oliveira MD;     Cc: Phone Messages Pool ([id number]_[facility]); Bird Marshall MD;      Sent: 8/6/2021 1:54:39 PM CDT  Subject: Urine culture       Not on Rx treatment was seen 7/28/21. No Rx needed, Agree?      Test Name In Range Out Of Range Reference Range Lab  ==============================  CB  CULTURE, URINE, ROUTINE  Micro Number: [id number]  Test Status: Final  Specimen Source: Urine  Specimen Quality: Adequate  Result: Growth of mixed caitlin was isolated, suggesting  probable contamination. No further testing will  be performed. If clinically indicated,  recollection using a method to minimize  contamination, with prompt transfer to Urine  Culture Transport Tube, is recommended.  PERFORMING SITE:  [address] [address] : ZULEYKA BOLANOS MD, CLIA: [id number]  PAGE        Results:  Date Result Name Value   8/4/2021 1:02 PM Urine Culture See comment---------------------  From: Kashif De Oliveira MD   To: Kadi Ko RN;     Sent: 8/6/2021 2:08:25 PM CDT  Subject: RE: Urine culture     agree.  No Abxnoted

## 2022-02-16 NOTE — PROGRESS NOTES
Chief Complaint    Patient here today for medical f/u.  History of Present Illness       Radha complains of chronic right-sided abdominal pain of uncertain duration.  She has had chronic urinary incontinence with recurrent UTIs since treatment with chemotherapy and radiation for anal cancer in 2007.  Has had colonic polyps followed regularly with the last colonoscopy in 2018.  No nausea or vomiting.  No dysuria.  Occasional diarrheal stools.  Stools tend to be dark but tested heme-negative.  Review of Systems       No fever, chills, cough, dyspnea, headache, myalgia.  Physical Exam   Vitals & Measurements    T: 99.6  F (Tympanic)  HR: 74 (Peripheral)  BP: 132/67     WT: 93.7 lb        Patient appears comfortable and in no distress.  Alert and oriented.  In good spirits.  Chest clear.  Cardiac exam is regular.  Abdomen is soft and nontender.  No edema.  Assessment/Plan       Anal cancer (C21.0)          Continue her usual follow-up at [facility].         Ordered:          CT Abdomen and Pelvis w/contrast (Request), Instructions: IV CONTRAST (no oral), Anal cancer  Osteoporosis  HTN, goal below 130/80  Bladder incontinence          Review Orders for Potential Authorizations, 08/09/21 14:05:33 CDT                Bladder incontinence (R32)          Urology referral.         Ordered:          CT Abdomen and Pelvis w/contrast (Request), Instructions: IV CONTRAST (no oral), Anal cancer  Osteoporosis  HTN, goal below 130/80  Bladder incontinence          Referral (Request), 08/09/21 14:03:00 CDT, Referred to: Urology, Bladder incontinence  Chronic abdominal pain          Review Orders for Potential Authorizations, 08/09/21 14:05:33 CDT                Chronic abdominal pain (R10.9)          CT scan.         Ordered:          [id number] office o/p est mod 30-39 min (Charge), Quantity: 1, Chronic abdominal pain          Referral (Request), 08/09/21 14:03:00 CDT, Referred to: Urology, Bladder incontinence  Chronic abdominal  pain                HTN, goal below 130/80 (I10)          Controlled.         Ordered:          CT Abdomen and Pelvis w/contrast (Request), Instructions: IV CONTRAST (no oral), Anal cancer  Osteoporosis  HTN, goal below 130/80  Bladder incontinence          Review Orders for Potential Authorizations, 08/09/21 14:05:33 CDT                Osteoporosis (M81.0)          On therapy.         Ordered:          CT Abdomen and Pelvis w/contrast (Request), Instructions: IV CONTRAST (no oral), Anal cancer  Osteoporosis  HTN, goal below 130/80  Bladder incontinence          Review Orders for Potential Authorizations, 08/09/21 14:05:33 CDT           Patient Information     Name:ESTEBAN LEROY      Address:      [address] [id number]     Sex:Female     YOB: 1940     [phone]     MRN:[id number]     FIN:[id number]     Location:[facility]     Date of Service:08/09/2021      Primary Care Physician:       Hugh Torres MD, [phone]      Attending Physician:       Hugh Torres MD, [phone]  Problem List/Past Medical History    Ongoing     Allergic Rhinitis     Anal cancer     Bladder incontinence     Bunion of great toe of left foot     Colon cancer     HTN, goal below 130/80     Hypertension     Nephrolithiasis     Osteoporosis     Pancreatitis     Peripheral vascular disease     Pressure ulcer of left foot, stage 2     Tobacco user     VITREAL HEMORRHAGE RIGHT EYE    Historical     No qualifying data  Procedure/Surgical History     Colonoscopy  Medications    amLODIPine 5 mg oral tablet, 5 mg= 1 tab(s), Oral, daily    cyclobenzaprine, 5 mg, Oral, hs    Fish Oil, 1000 mg, Oral    gabapentin 300 mg oral capsule, 600 mg= 2 cap(s), Oral, tid    ketoconazole 2% topical cream, 1 ignacio, Topical, daily    lisinopril 10 mg oral tablet, 10 mg= 1 tab(s), Oral, daily    omeprazole 20 mg oral delayed release capsule, 20 mg= 1 cap(s), Oral, bid    oxyCODONE 5 mg oral  capsule, 5 mg= 1 cap(s), Oral, q6 hrs, PRN    Reclast 5 mg/100 mL intravenous solution, See Instructions    senna oral tablet, 2 tab(s), Oral, daily    Vitamin D3 1000 intl units oral capsule, 1000 International Unit= 1 cap(s), Oral, daily  Allergies    trospium (Swelling of hand)  Social History    Smoking Status     Current every day smoker     Electronic Cigarette/Vaping      Electronic Cigarette Use: Never.     Exercise - Regular exercise     Tobacco - Current      Smoker, current status unknown  Family History    Cancer: Mother.    Heart attack: Father.    Heart disease: Father.  Lab Results          Lab Results (Last 4 results within 90 days)           Sodium Level: 138 mmol/L [135 mmol/L - 146 mmol/L] (06/14/21 12:31:00)          Potassium Level: 4.4 mmol/L [3.5 mmol/L - 5.3 mmol/L] (06/14/21 12:31:00)          Chloride Level: 102 mmol/L [98 mmol/L - 110 mmol/L] (06/14/21 12:31:00)          CO2 Level: 29 mmol/L [20 mmol/L - 32 mmol/L] (06/14/21 12:31:00)          Glucose Level: 101 mg/dL High [65 mg/dL - 99 mg/dL] (06/14/21 12:31:00)          BUN: 16 mg/dL [7 mg/dL - 25 mg/dL] (06/14/21 12:31:00)          Creatinine Level: 0.63 mg/dL [0.6 mg/dL - 0.88 mg/dL] (06/14/21 12:31:00)          BUN/Creat Ratio: NOT APPLICABLE [6  - 22] (06/14/21 12:31:00)          eGFR: 84 mL/min/1.73m2 (06/14/21 12:31:00)          eGFR African American: 97 mL/min/1.73m2 (06/14/21 12:31:00)          Calcium Level: 9.9 mg/dL [8.6 mg/dL - 10.4 mg/dL] (06/14/21 12:31:00)          WBC: 8.9 [3.8  - 10.8] (06/14/21 12:31:00)          RBC: 4.79 [3.8  - 5.1] (06/14/21 12:31:00)          Hgb: 14.4 gm/dL [11.7 gm/dL - 15.5 gm/dL] (06/14/21 12:31:00)          Hct: 43.9 % [35 % - 45 %] (06/14/21 12:31:00)          MCV: 91.6 fL [80 fL - 100 fL] (06/14/21 12:31:00)          MCH: 30.1 pg [27 pg - 33 pg] (06/14/21 12:31:00)          MCHC: 32.8 gm/dL [32 gm/dL - 36 gm/dL] (06/14/21 12:31:00)          RDW: 12.7 % [11 % - 15 %] (06/14/21 12:31:00)           Platelet: 392 [140  - 400] (06/14/21 12:31:00)          MPV: 8.7 fL [7.5 fL - 12.5 fL] (06/14/21 12:31:00)          Lymphocytes: 23.1 % (06/14/21 12:31:00)          Abs Lymphocytes: 2056 [850  - 3900] (06/14/21 12:31:00)          Neutrophils: 65.6 % (06/14/21 12:31:00)          Abs Neutrophils: 5838 [1500  - 7800] (06/14/21 12:31:00)          Monocytes: 6.6 % (06/14/21 12:31:00)          Abs Monocytes: 587 [200  - 950] (06/14/21 12:31:00)          Eosinophils: 3.7 % (06/14/21 12:31:00)          Abs Eosinophils: 329 [15  - 500] (06/14/21 12:31:00)          Basophils: 1 % (06/14/21 12:31:00)          Abs Basophils: 89 [0  - 200] (06/14/21 12:31:00)          Fecal Globin: See comment (06/23/21 14:18:00)          Urine Culture: See comment (08/04/21 13:02:00)          Urine Culture: See comment Abnormal (07/15/21 11:13:00)          Urine Culture: See comment Abnormal (06/14/21 12:31:00)  Immunizations          Scheduled Immunizations          Dose Date(s)          influenza virus vaccine, inactivated          11/10/2005, 10/26/2006, 09/14/2009, 09/29/2010, 12/06/2012, 09/22/2014, 12/02/2015, 12/20/2016, 09/25/2017, 09/26/2018          pneumococcal (PCV13)          12/20/2016          pneumococcal (PPSV23)          01/25/2012          Td          01/25/2012, 01/01/2011          Other Immunizations          influenza virus vaccine, inactivated          12/30/2011

## 2022-02-16 NOTE — NURSING NOTE
Comprehensive Intake Entered On:  1/9/2020 9:59 AM CST    Performed On:  1/9/2020 9:53 AM CST by Shahla Mckoy MA               Summary   Chief Complaint :   Follow up cough, fever, facial swelling   Weight Measured :   97 lb(Converted to: 97 lb 0 oz, 44.00 kg)    Height Measured :   66 in(Converted to: 5 ft 6 in, 167.64 cm)    Body Mass Index :   15.65 kg/m2 (LOW)    Body Surface Area :   1.43 m2   Systolic Blood Pressure :   128 mmHg   Diastolic Blood Pressure :   78 mmHg   Mean Arterial Pressure :   95 mmHg   Peripheral Pulse Rate :   80 bpm   BP Site :   Left arm   Pulse Site :   Radial artery   BP Method :   Manual   HR Method :   Manual   Temperature Tympanic :   98.5 DegF(Converted to: 36.9 DegC)    Shahla Mckoy MA - 1/9/2020 9:53 AM CST   Health Status   Allergies Verified? :   Yes   Medication History Verified? :   Yes   Medical History Verified? :   Yes   Pre-Visit Planning Status :   Completed   Tobacco Use? :   Current every day smoker   Shahla Mckoy MA - 1/9/2020 9:53 AM CST   Consents   Consent for Immunization Exchange :   Consent Granted   Consent for Immunizations to Providers :   Consent Granted   Shahla Mckoy MA - 1/9/2020 9:53 AM CST   Meds / Allergies   (As Of: 1/9/2020 9:59:44 AM CST)   Allergies (Active)   trospium  Estimated Onset Date:   Unspecified ; Reactions:   Swelling of hand ; Created By:   Kadi Ko RN; Reaction Status:   Active ; Category:   Drug ; Substance:   trospium ; Type:   Allergy ; Severity:   Moderate ; Updated By:   Kadi Ko RN; Reviewed Date:   1/9/2020 9:57 AM CST        Medication List   (As Of: 1/9/2020 9:59:44 AM CST)   Prescription/Discharge Order    amoxicillin-clavulanate  :   amoxicillin-clavulanate ; Status:   Prescribed ; Ordered As Mnemonic:   Augmentin 875 mg-125 mg oral tablet ; Simple Display Line:   1 tab(s), po, bidac, for 10 day(s), 20 tab(s), 0 Refill(s) ; Ordering Provider:   Senthil Duran MD; Catalog Code:    amoxicillin-clavulanate ; Order Dt/Tm:   1/6/2020 12:26:02 PM CST          ipratropium  :   ipratropium ; Status:   Prescribed ; Ordered As Mnemonic:   ipratropium CFC free 17 mcg/inh inhalation aerosol ; Simple Display Line:   2 puff(s), inh, qid, 1 EA, 5 Refill(s) ; Ordering Provider:   Senthil Duran MD; Catalog Code:   ipratropium ; Order Dt/Tm:   12/29/2016 4:19:08 PM CST            Home Meds    oxyCODONE  :   oxyCODONE ; Status:   Documented ; Ordered As Mnemonic:   oxyCODONE 5 mg oral capsule ; Simple Display Line:   5 mg, 1 cap(s), Oral, q6 hrs, PRN: for pain, 0 Refill(s) ; Catalog Code:   oxyCODONE ; Order Dt/Tm:   1/9/2020 9:56:36 AM CST          cyclobenzaprine  :   cyclobenzaprine ; Status:   Documented ; Ordered As Mnemonic:   cyclobenzaprine ; Simple Display Line:   Oral, 0 Refill(s) ; Catalog Code:   cyclobenzaprine ; Order Dt/Tm:   12/19/2019 2:44:36 PM CST          gabapentin  :   gabapentin ; Status:   Documented ; Ordered As Mnemonic:   gabapentin 100 mg oral tablet ; Catalog Code:   gabapentin ; Order Dt/Tm:   1/7/2013 11:32:55 AM CST          senna  :   senna ; Status:   Documented ; Ordered As Mnemonic:   senna oral tablet ; Simple Display Line:   2 tab(s), po, daily ; Catalog Code:   senna ; Order Dt/Tm:   1/7/2013 11:33:17 AM CST          cholecalciferol  :   cholecalciferol ; Status:   Documented ; Ordered As Mnemonic:   Vitamin D3 1000 intl units oral capsule ; Simple Display Line:   1,000 International Unit, 1 cap(s), PO, daily ; Catalog Code:   cholecalciferol ; Order Dt/Tm:   7/24/2012 2:30:03 PM CDT          omega-3 polyunsaturated fatty acids  :   omega-3 polyunsaturated fatty acids ; Status:   Documented ; Ordered As Mnemonic:   Fish Oil ; Simple Display Line:   1,000 mg, PO ; Catalog Code:   omega-3 polyunsaturated fatty acids ; Order Dt/Tm:   7/24/2012 2:30:31 PM CDT          omeprazole  :   omeprazole ; Status:   Documented ; Ordered As Mnemonic:   omeprazole ; Simple Display Line:    PO, daily ; Catalog Code:   omeprazole ; Order Dt/Tm:   7/24/2012 2:27:58 PM CDT          lisinopril  :   lisinopril ; Status:   Documented ; Ordered As Mnemonic:   lisinopril 20 mg oral tablet ; Simple Display Line:   20 mg, 1 tab(s), po, daily ; Catalog Code:   lisinopril ; Order Dt/Tm:   9/15/2010 11:31:32 AM CDT

## 2022-02-16 NOTE — PROGRESS NOTES
Patient:   ESTEBAN LEROY            MRN: [id number]            FIN: [id number]               Age:   79 years     Sex:  Female     :  1940   Associated Diagnoses:   Periapical abscess with facial involvement   Author:   Senthil Duran MD      Impression and Plan   Diagnosis     Periapical abscess with facial involvement (MHL49-GP K04.7).     Course:  Improving.    Plan:    Continue Augmentin until complete  Make appointment with dentist.    Orders     Orders   Charges (Evaluation and Management):  [id number] office outpatient visit 15 minutes (Charge) (Order): Quantity: 1, Periapical abscess with facial involvement.     Orders (Selected)   Prescriptions  Prescribed  Augmentin 875 mg-125 mg oral tablet: = 1 tab(s), po, bidac, # 20 tab(s), 0 Refill(s), Type: Maintenance, Pharmacy: [facility], 1 tab(s) Oral bidac,x10 day(s).        Visit Information   Visit type:  Scheduled follow-up.    Accompanied by:  No one.    Source of history:  Self.    Referral source:  Self.    History limitation:  None.       Chief Complaint   2020 9:53 AM CST     Follow up cough, fever, facial swelling        History of Present Illness             The patient presents with oral problem(s).  The oral problem is described as pain and swelling over the left cheek - now improving after starting Augmentin on ..  The location of the oral problem is left.  The severity of the oral problem is moderate.  The oral problem is constant.  The oral problem has lasted for 3 day(s).  The context of the oral problem: occurred associated with dental decay.        Review of Systems   Constitutional:  Negative.    Eye:  Negative.    Ear/Nose/Mouth/Throat:  Negative except as documented in history of present illness.    Respiratory:  Negative.    Cardiovascular:  Negative.    Gastrointestinal:  Negative.    Genitourinary:  Negative.    Hematology/Lymphatics:  Negative.    Endocrine:  Negative.    Immunologic:  Negative.    Musculoskeletal:   Negative.    Integumentary:  Negative.    Neurologic:  Negative.    Psychiatric:  Negative.    All other systems reviewed and negative      Health Status   Allergies:    Allergic Reactions (Selected)  Moderate  Trospium (Swelling of hand)   Medications:  (Selected)   Prescriptions  Prescribed  Augmentin 875 mg-125 mg oral tablet: = 1 tab(s), po, bidac, # 20 tab(s), 0 Refill(s), Type: Maintenance, Pharmacy: [facility], 1 tab(s) Oral bidac,x10 day(s)  ipratropium CFC free 17 mcg/inh inhalation aerosol: 2 puff(s), inh, qid, # 1 EA, 5 Refill(s), Type: Maintenance, Pharmacy: [facility], 2 puff(s) inh qid  Documented Medications  Documented  Fish Oil: ( 1,000 mg ), PO, 0 Refill(s), Type: Soft Stop  Vitamin D3 1000 intl units oral capsule: 1 cap(s) ( 1,000 International Unit ), PO, daily, 0 Refill(s), Type: Soft Stop  cyclobenzaprine: Oral, 0 Refill(s), Type: Maintenance  gabapentin 100 mg oral tablet: 0 Refill(s), Type: Maintenance  lisinopril 20 mg oral tablet: 1 tab(s) ( 20 mg ), po, daily, 0 Refill(s)  omeprazole: PO, daily, 0 Refill(s), Type: Soft Stop  oxyCODONE 5 mg oral capsule: = 1 cap(s) ( 5 mg ), Oral, q6 hrs, PRN: for pain, 0 Refill(s), Type: Maintenance  senna oral tablet: 2 tab(s), po, daily, 0 Refill(s), Type: Maintenance   Problem list:    All Problems  Allergic Rhinitis / ICD-9-.9 / Confirmed  Bunion of great toe of left foot / SNOMED CT 7669337668 / Confirmed  Colon cancer / ICD-9-.9 / Confirmed  Hypertension / ICD-9-.9 / Confirmed  Nephrolithiasis / SNOMED CT 354284195 / Confirmed  Pancreatitis / ICD-9-.0 / Confirmed  Peripheral vascular disease / SNOMED CT 1645211730 / Confirmed  Pressure ulcer of left foot, stage 2 / SNOMED CT 5485242851 / Confirmed  VITREAL HEMORRHAGE RIGHT EYE / Confirmed      Histories   Past Medical History:    Active  Hypertension (401.9)  Allergic Rhinitis (477.9)  Colon cancer (153.9)      Physical Examination   Vital Signs   1/9/2020 9:53  AM CST Temperature Tympanic 98.5 DegF    Peripheral Pulse Rate 80 bpm    Pulse Site Radial artery    HR Method Manual    Systolic Blood Pressure 128 mmHg    Diastolic Blood Pressure 78 mmHg    Mean Arterial Pressure 95 mmHg    BP Site Left arm    BP Method Manual      Measurements from flowsheet : Measurements   1/9/2020 9:53 AM CST Height Measured - Standard 66 in    Weight Measured - Standard 97 lb    BSA 1.43 m2    Body Mass Index 15.65 kg/m2  LOW      General:  No acute distress.    Eye:  Pupils are equal, round and reactive to light, Extraocular movements are intact, Normal conjunctiva, Vision unchanged.    HENT:  Normocephalic, Normal hearing, Oral mucosa is moist, No pharyngeal erythema, left upper gum tender and swollen.  Facial swelling has resolved..    Neck:  Supple, No lymphadenopathy, No thyromegaly.    Respiratory:  Lungs are clear to auscultation, Respirations are non-labored, Breath sounds are equal.    Cardiovascular:  Normal rate, Regular rhythm, No murmur, No gallop, Good pulses equal in all extremities, Normal peripheral perfusion, No edema.    Musculoskeletal:  Normal gait.    Integumentary:  Warm, Dry, Pink.    Neurologic:  Alert, Oriented.    Psychiatric:  Cooperative, Appropriate mood & affect, Normal judgment.